# Patient Record
Sex: MALE | Race: WHITE | HISPANIC OR LATINO | Employment: OTHER | URBAN - METROPOLITAN AREA
[De-identification: names, ages, dates, MRNs, and addresses within clinical notes are randomized per-mention and may not be internally consistent; named-entity substitution may affect disease eponyms.]

---

## 2018-03-21 ENCOUNTER — RECORDS - HEALTHEAST (OUTPATIENT)
Dept: LAB | Facility: CLINIC | Age: 78
End: 2018-03-21

## 2018-03-21 LAB
ANION GAP SERPL CALCULATED.3IONS-SCNC: 8 MMOL/L (ref 5–18)
BUN SERPL-MCNC: 13 MG/DL (ref 8–28)
CALCIUM SERPL-MCNC: 9.4 MG/DL (ref 8.5–10.5)
CHLORIDE BLD-SCNC: 106 MMOL/L (ref 98–107)
CHOLEST SERPL-MCNC: 129 MG/DL
CO2 SERPL-SCNC: 27 MMOL/L (ref 22–31)
CREAT SERPL-MCNC: 0.81 MG/DL (ref 0.7–1.3)
FASTING STATUS PATIENT QL REPORTED: ABNORMAL
GFR SERPL CREATININE-BSD FRML MDRD: >60 ML/MIN/1.73M2
GLUCOSE BLD-MCNC: 99 MG/DL (ref 70–125)
HDLC SERPL-MCNC: 35 MG/DL
LDLC SERPL CALC-MCNC: 69 MG/DL
POTASSIUM BLD-SCNC: 4.3 MMOL/L (ref 3.5–5)
SODIUM SERPL-SCNC: 141 MMOL/L (ref 136–145)
TRIGL SERPL-MCNC: 125 MG/DL

## 2018-05-01 ENCOUNTER — RECORDS - HEALTHEAST (OUTPATIENT)
Dept: ADMINISTRATIVE | Facility: OTHER | Age: 78
End: 2018-05-01

## 2018-05-02 ENCOUNTER — HOSPITAL ENCOUNTER (OUTPATIENT)
Dept: CT IMAGING | Facility: CLINIC | Age: 78
Discharge: HOME | End: 2018-05-02
Attending: SURGERY

## 2018-05-02 DIAGNOSIS — I65.29 CAROTID STENOSIS: ICD-10-CM

## 2018-05-02 LAB
CREAT BLD-MCNC: 1.1 MG/DL
POC GFR AMER AF HE - HISTORICAL: >60 ML/MIN/1.73M2
POC GFR NON AMER AF HE - HISTORICAL: >60 ML/MIN/1.73M2

## 2018-05-07 ENCOUNTER — AMBULATORY - HEALTHEAST (OUTPATIENT)
Dept: CARDIOLOGY | Facility: CLINIC | Age: 78
End: 2018-05-07

## 2018-05-07 ENCOUNTER — RECORDS - HEALTHEAST (OUTPATIENT)
Dept: ADMINISTRATIVE | Facility: OTHER | Age: 78
End: 2018-05-07

## 2018-05-08 ENCOUNTER — OFFICE VISIT - HEALTHEAST (OUTPATIENT)
Dept: CARDIOLOGY | Facility: CLINIC | Age: 78
End: 2018-05-08

## 2018-05-08 DIAGNOSIS — E78.00 PURE HYPERCHOLESTEROLEMIA WITH TARGET LOW DENSITY LIPOPROTEIN (LDL) CHOLESTEROL LESS THAN 70 MG/DL: ICD-10-CM

## 2018-05-08 DIAGNOSIS — I10 ESSENTIAL HYPERTENSION: ICD-10-CM

## 2018-05-08 DIAGNOSIS — I25.10 CORONARY ARTERY DISEASE INVOLVING NATIVE CORONARY ARTERY OF NATIVE HEART WITHOUT ANGINA PECTORIS: ICD-10-CM

## 2018-05-08 RX ORDER — NITROGLYCERIN 0.4 MG/1
0.4 TABLET SUBLINGUAL EVERY 5 MIN PRN
Refills: 1 | Status: SHIPPED | COMMUNITY
Start: 2018-03-23

## 2018-05-08 RX ORDER — TAMSULOSIN HYDROCHLORIDE 0.4 MG/1
0.4 CAPSULE ORAL
Refills: 1 | Status: SHIPPED | COMMUNITY
Start: 2018-03-15

## 2018-05-08 RX ORDER — IBUPROFEN 800 MG/1
800 TABLET, FILM COATED ORAL EVERY 8 HOURS PRN
Status: SHIPPED | COMMUNITY
Start: 2018-05-08

## 2018-05-08 RX ORDER — ATORVASTATIN CALCIUM 40 MG/1
40 TABLET, FILM COATED ORAL AT BEDTIME
Refills: 4 | Status: SHIPPED | COMMUNITY
Start: 2018-03-08

## 2018-05-08 ASSESSMENT — MIFFLIN-ST. JEOR: SCORE: 1311.81

## 2018-05-14 ENCOUNTER — ANESTHESIA - HEALTHEAST (OUTPATIENT)
Dept: SURGERY | Facility: CLINIC | Age: 78
End: 2018-05-14

## 2018-05-15 ENCOUNTER — SURGERY - HEALTHEAST (OUTPATIENT)
Dept: SURGERY | Facility: CLINIC | Age: 78
End: 2018-05-15

## 2018-05-15 ASSESSMENT — MIFFLIN-ST. JEOR: SCORE: 1294.23

## 2018-05-16 ASSESSMENT — MIFFLIN-ST. JEOR: SCORE: 1316.35

## 2019-01-23 ENCOUNTER — RECORDS - HEALTHEAST (OUTPATIENT)
Dept: LAB | Facility: CLINIC | Age: 79
End: 2019-01-23

## 2019-01-23 LAB
ANION GAP SERPL CALCULATED.3IONS-SCNC: 11 MMOL/L (ref 5–18)
BUN SERPL-MCNC: 15 MG/DL (ref 8–28)
CALCIUM SERPL-MCNC: 9.4 MG/DL (ref 8.5–10.5)
CHLORIDE BLD-SCNC: 102 MMOL/L (ref 98–107)
CHOLEST SERPL-MCNC: 113 MG/DL
CO2 SERPL-SCNC: 27 MMOL/L (ref 22–31)
CREAT SERPL-MCNC: 0.88 MG/DL (ref 0.7–1.3)
FASTING STATUS PATIENT QL REPORTED: ABNORMAL
GFR SERPL CREATININE-BSD FRML MDRD: >60 ML/MIN/1.73M2
GLUCOSE BLD-MCNC: 84 MG/DL (ref 70–125)
HDLC SERPL-MCNC: 35 MG/DL
LDLC SERPL CALC-MCNC: 47 MG/DL
POTASSIUM BLD-SCNC: 4.2 MMOL/L (ref 3.5–5)
SODIUM SERPL-SCNC: 140 MMOL/L (ref 136–145)
TRIGL SERPL-MCNC: 155 MG/DL

## 2019-07-03 ENCOUNTER — AMBULATORY - HEALTHEAST (OUTPATIENT)
Dept: CARDIOLOGY | Facility: CLINIC | Age: 79
End: 2019-07-03

## 2019-07-03 ENCOUNTER — RECORDS - HEALTHEAST (OUTPATIENT)
Dept: ADMINISTRATIVE | Facility: OTHER | Age: 79
End: 2019-07-03

## 2019-07-05 ENCOUNTER — OFFICE VISIT - HEALTHEAST (OUTPATIENT)
Dept: CARDIOLOGY | Facility: CLINIC | Age: 79
End: 2019-07-05

## 2019-07-05 DIAGNOSIS — R06.09 DYSPNEA ON EXERTION: ICD-10-CM

## 2019-07-05 DIAGNOSIS — I77.9 CAROTID ARTERIAL DISEASE (H): ICD-10-CM

## 2019-07-05 DIAGNOSIS — I25.10 CORONARY ARTERY DISEASE INVOLVING NATIVE CORONARY ARTERY WITHOUT ANGINA PECTORIS: ICD-10-CM

## 2019-07-05 LAB
BNP SERPL-MCNC: 101 PG/ML (ref 0–86)
ERYTHROCYTE [DISTWIDTH] IN BLOOD BY AUTOMATED COUNT: 12.3 % (ref 11–14.5)
HCT VFR BLD AUTO: 45.7 % (ref 40–54)
HGB BLD-MCNC: 15 G/DL (ref 14–18)
MCH RBC QN AUTO: 29.4 PG (ref 27–34)
MCHC RBC AUTO-ENTMCNC: 32.8 G/DL (ref 32–36)
MCV RBC AUTO: 90 FL (ref 80–100)
PLATELET # BLD AUTO: 192 THOU/UL (ref 140–440)
PMV BLD AUTO: 11.7 FL (ref 8.5–12.5)
RBC # BLD AUTO: 5.1 MILL/UL (ref 4.4–6.2)
WBC: 11.4 THOU/UL (ref 4–11)

## 2019-07-05 RX ORDER — CHLORHEXIDINE GLUCONATE ORAL RINSE 1.2 MG/ML
15 SOLUTION DENTAL 2 TIMES DAILY
Status: SHIPPED | COMMUNITY
Start: 2019-06-21

## 2019-07-05 ASSESSMENT — MIFFLIN-ST. JEOR: SCORE: 1342.66

## 2019-07-18 ENCOUNTER — HOSPITAL ENCOUNTER (OUTPATIENT)
Dept: CARDIOLOGY | Facility: CLINIC | Age: 79
Discharge: HOME | End: 2019-07-18
Attending: INTERNAL MEDICINE

## 2019-07-18 DIAGNOSIS — R06.09 DYSPNEA ON EXERTION: ICD-10-CM

## 2019-07-18 DIAGNOSIS — R06.09 OTHER FORMS OF DYSPNEA: ICD-10-CM

## 2019-07-18 DIAGNOSIS — I25.10 CORONARY ARTERY DISEASE INVOLVING NATIVE CORONARY ARTERY WITHOUT ANGINA PECTORIS: ICD-10-CM

## 2019-07-18 LAB
AORTIC ROOT: 2.9 CM
BSA FOR ECHO PROCEDURE: 1.81 M2
CV BLOOD PRESSURE: ABNORMAL MMHG
CV ECHO HEIGHT: 65 IN
CV ECHO WEIGHT: 157 LBS
DOP CALC LVOT AREA: 2.54 CM2
DOP CALC LVOT DIAMETER: 1.8 CM
DOP CALC LVOT PEAK VEL: 72.2 CM/S
DOP CALC LVOT STROKE VOLUME: 46.5 CM3
DOP CALCLVOT PEAK VEL VTI: 18.3 CM
ECHO EJECTION FRACTION ESTIMATED: 65 %
EJECTION FRACTION: 77 % (ref 55–75)
FRACTIONAL SHORTENING: 42.6 % (ref 28–44)
INTERVENTRICULAR SEPTUM IN END DIASTOLE: 1.03 CM (ref 0.6–1)
IVS/PW RATIO: 1.1
LA AREA 1: 14.4 CM2
LEFT ATRIUM LENGTH: 4.4 CM
LEFT ATRIUM SIZE: 4.3 CM
LEFT ATRIUM TO AORTIC ROOT RATIO: 1.48 NO UNITS
LEFT VENTRICLE CARDIAC INDEX: 1.3 L/MIN/M2
LEFT VENTRICLE CARDIAC OUTPUT: 2.3 L/MIN
LEFT VENTRICLE DIASTOLIC VOLUME INDEX: 30.9 CM3/M2 (ref 34–74)
LEFT VENTRICLE DIASTOLIC VOLUME: 56 CM3 (ref 62–150)
LEFT VENTRICLE HEART RATE: 49 BPM
LEFT VENTRICLE MASS INDEX: 94.1 G/M2
LEFT VENTRICLE SYSTOLIC VOLUME INDEX: 7.2 CM3/M2 (ref 11–31)
LEFT VENTRICLE SYSTOLIC VOLUME: 13 CM3 (ref 21–61)
LEFT VENTRICULAR INTERNAL DIMENSION IN DIASTOLE: 4.91 CM (ref 4.2–5.8)
LEFT VENTRICULAR INTERNAL DIMENSION IN SYSTOLE: 2.82 CM (ref 2.5–4)
LEFT VENTRICULAR MASS: 170.4 G
LEFT VENTRICULAR OUTFLOW TRACT MEAN GRADIENT: 1 MMHG
LEFT VENTRICULAR OUTFLOW TRACT MEAN VELOCITY: 50.1 CM/S
LEFT VENTRICULAR OUTFLOW TRACT PEAK GRADIENT: 2 MMHG
LEFT VENTRICULAR POSTERIOR WALL IN END DIASTOLE: 0.92 CM (ref 0.6–1)
LV STROKE VOLUME INDEX: 25.7 ML/M2
MITRAL VALVE E/A RATIO: 1.5
MV AVERAGE E/E' RATIO: 12.9 CM/S
MV DECELERATION TIME: 169 MS
MV E'TISSUE VEL-LAT: 9.46 CM/S
MV E'TISSUE VEL-MED: 5.46 CM/S
MV LATERAL E/E' RATIO: 10.2
MV MEDIAL E/E' RATIO: 17.6
MV PEAK A VELOCITY: 65.6 CM/S
MV PEAK E VELOCITY: 96.3 CM/S
NUC REST DIASTOLIC VOLUME INDEX: 2512 LBS
NUC REST SYSTOLIC VOLUME INDEX: 65 IN
PR MAX PG: 4 MMHG
PR PEAK VELOCITY: 95.7 CM/S
TRICUSPID REGURGITATION PEAK PRESSURE GRADIENT: 28.5 MMHG
TRICUSPID VALVE PEAK REGURGITANT VELOCITY: 267 CM/S

## 2019-07-18 ASSESSMENT — MIFFLIN-ST. JEOR: SCORE: 1339.03

## 2019-07-19 ENCOUNTER — COMMUNICATION - HEALTHEAST (OUTPATIENT)
Dept: CARDIOLOGY | Facility: CLINIC | Age: 79
End: 2019-07-19

## 2019-07-19 DIAGNOSIS — I25.10 CORONARY ARTERY DISEASE INVOLVING NATIVE CORONARY ARTERY WITHOUT ANGINA PECTORIS: ICD-10-CM

## 2019-07-19 DIAGNOSIS — R06.09 DYSPNEA ON EXERTION: ICD-10-CM

## 2019-07-25 ENCOUNTER — HOSPITAL ENCOUNTER (OUTPATIENT)
Dept: RESPIRATORY THERAPY | Facility: HOSPITAL | Age: 79
Discharge: HOME | End: 2019-07-25
Attending: SURGERY

## 2019-07-25 DIAGNOSIS — R06.02 SOB (SHORTNESS OF BREATH): ICD-10-CM

## 2019-07-25 LAB
BASE EXCESS BLDA CALC-SCNC: 0.8 MMOL/L
COHGB MFR BLD: 98.4 % (ref 95–96)
HCO3, ARTERIAL CALC - HISTORICAL: 25.5 MMOL/L (ref 23–29)
O2/TOTAL GAS SETTING VFR VENT: ABNORMAL %
OXYHEMOGLOBIN - HISTORICAL: 96.6 % (ref 95–96)
PCO2 BLD: 35 MM HG (ref 35–45)
PH BLD: 7.45 [PH] (ref 7.37–7.44)
PO2 BLD: 89 MM HG (ref 75–85)
TEMPERATURE: 37 DEGREES C
VENTILATION MODE: ABNORMAL

## 2019-07-26 ENCOUNTER — HOSPITAL ENCOUNTER (OUTPATIENT)
Dept: NUCLEAR MEDICINE | Facility: CLINIC | Age: 79
Discharge: HOME | End: 2019-07-26
Attending: INTERNAL MEDICINE

## 2019-07-26 ENCOUNTER — HOSPITAL ENCOUNTER (OUTPATIENT)
Dept: CARDIOLOGY | Facility: CLINIC | Age: 79
Discharge: HOME | End: 2019-07-26
Attending: INTERNAL MEDICINE

## 2019-07-26 ENCOUNTER — COMMUNICATION - HEALTHEAST (OUTPATIENT)
Dept: TELEHEALTH | Facility: CLINIC | Age: 79
End: 2019-07-26

## 2019-07-26 DIAGNOSIS — R06.09 OTHER FORMS OF DYSPNEA: ICD-10-CM

## 2019-07-26 DIAGNOSIS — R06.09 DYSPNEA ON EXERTION: ICD-10-CM

## 2019-07-26 DIAGNOSIS — I25.10 CORONARY ARTERY DISEASE INVOLVING NATIVE CORONARY ARTERY WITHOUT ANGINA PECTORIS: ICD-10-CM

## 2019-07-26 LAB
CV STRESS CURRENT BP HE: NORMAL
CV STRESS CURRENT HR HE: 48
CV STRESS CURRENT HR HE: 49
CV STRESS CURRENT HR HE: 62
CV STRESS CURRENT HR HE: 64
CV STRESS CURRENT HR HE: 65
CV STRESS CURRENT HR HE: 66
CV STRESS CURRENT HR HE: 66
CV STRESS CURRENT HR HE: 67
CV STRESS CURRENT HR HE: 67
CV STRESS CURRENT HR HE: 68
CV STRESS CURRENT HR HE: 70
CV STRESS CURRENT HR HE: 71
CV STRESS DEVIATION TIME HE: NORMAL
CV STRESS ECHO PERCENT HR HE: NORMAL
CV STRESS EXERCISE STAGE HE: NORMAL
CV STRESS FINAL RESTING BP HE: NORMAL
CV STRESS FINAL RESTING HR HE: 65
CV STRESS MAX HR HE: 71
CV STRESS MAX TREADMILL GRADE HE: 0
CV STRESS MAX TREADMILL SPEED HE: 0
CV STRESS PEAK DIA BP HE: NORMAL
CV STRESS PEAK SYS BP HE: NORMAL
CV STRESS PHASE HE: NORMAL
CV STRESS PROTOCOL HE: NORMAL
CV STRESS RESTING PT POSITION HE: NORMAL
CV STRESS ST DEVIATION AMOUNT HE: NORMAL
CV STRESS ST DEVIATION ELEVATION HE: NORMAL
CV STRESS ST EVELATION AMOUNT HE: NORMAL
CV STRESS TEST TYPE HE: NORMAL
CV STRESS TOTAL STAGE TIME MIN 1 HE: NORMAL
NUC STRESS EJECTION FRACTION: 67 %
STRESS ECHO BASELINE BP: NORMAL
STRESS ECHO BASELINE HR: 46
STRESS ECHO CALCULATED PERCENT HR: 51 %
STRESS ECHO LAST STRESS BP: NORMAL
STRESS ECHO LAST STRESS HR: 70

## 2019-08-01 ENCOUNTER — OFFICE VISIT - HEALTHEAST (OUTPATIENT)
Dept: CARDIOLOGY | Facility: CLINIC | Age: 79
End: 2019-08-01

## 2019-08-01 DIAGNOSIS — I77.9 CAROTID ARTERIAL DISEASE (H): ICD-10-CM

## 2019-08-01 DIAGNOSIS — R06.09 DYSPNEA ON EXERTION: ICD-10-CM

## 2019-08-01 DIAGNOSIS — I25.10 CORONARY ARTERY DISEASE INVOLVING NATIVE CORONARY ARTERY OF NATIVE HEART WITHOUT ANGINA PECTORIS: ICD-10-CM

## 2019-08-01 ASSESSMENT — MIFFLIN-ST. JEOR: SCORE: 1348.1

## 2020-07-15 ENCOUNTER — RECORDS - HEALTHEAST (OUTPATIENT)
Dept: LAB | Facility: CLINIC | Age: 80
End: 2020-07-15

## 2020-07-15 LAB
ANION GAP SERPL CALCULATED.3IONS-SCNC: 10 MMOL/L (ref 5–18)
BUN SERPL-MCNC: 17 MG/DL (ref 8–28)
CALCIUM SERPL-MCNC: 9.2 MG/DL (ref 8.5–10.5)
CHLORIDE BLD-SCNC: 106 MMOL/L (ref 98–107)
CHOLEST SERPL-MCNC: 123 MG/DL
CO2 SERPL-SCNC: 24 MMOL/L (ref 22–31)
CREAT SERPL-MCNC: 1.03 MG/DL (ref 0.7–1.3)
FASTING STATUS PATIENT QL REPORTED: ABNORMAL
GFR SERPL CREATININE-BSD FRML MDRD: >60 ML/MIN/1.73M2
GLUCOSE BLD-MCNC: 131 MG/DL (ref 70–125)
HDLC SERPL-MCNC: 32 MG/DL
LDLC SERPL CALC-MCNC: 55 MG/DL
POTASSIUM BLD-SCNC: 4.4 MMOL/L (ref 3.5–5)
PSA SERPL-MCNC: 2.7 NG/ML (ref 0–6.5)
SODIUM SERPL-SCNC: 140 MMOL/L (ref 136–145)
TRIGL SERPL-MCNC: 178 MG/DL

## 2021-05-30 NOTE — TELEPHONE ENCOUNTER
Call back received from patient daughter, results/recommendations reviewed. Daughter verbalized understanding and agreement with plan. Order for test placed-- transferred to scheduling to arrange. -jermaine

## 2021-05-30 NOTE — PATIENT INSTRUCTIONS - HE
Kevin Leblanc,    It was a pleasure to see you today at the [facility].     My recommendations after this visit include:    1.  We will obtain some blood work today to evaluate your shortness of breath.  We will call you with the results and any further recommendations.    2.  We will schedule an echocardiogram to evaluate the pumping function of your heart.  We will evaluate the pumping function of both the left and right ventricles.    3.  I will send a note to Dr. Kieran Griffin.  I suspect a sleep study would be a good test for you as I suspect to have sleep apnea Dr. Griffin can arrange this if he feels it is appropriate.        Axel Becerra

## 2021-05-30 NOTE — PROGRESS NOTES
RESPIRATORY CARE NOTE     Patient Name: Kevin Leblnac  Today's Date: 7/25/2019     Complete PFT done. Pt performed tests with good effort. DLCO and Pleth does not meet ATS criteria.  FVC does meet ATS criteria.  Multiple efforts done. Pt gave good efforts, best efforts saved.  ABGs drawn on room air.  Interpeter here for testing. Results scanned into epic. Pt left in no distress.       Renu Peguero, CYNDEET

## 2021-05-30 NOTE — TELEPHONE ENCOUNTER
----- Message from Axel Becerra MD sent at 7/19/2019  1:01 PM CDT -----  Karly,    Echocardiogram looks good.  Would recommend pharmacologic or stress nuclear study.    Thanks,    Axel

## 2021-05-31 NOTE — PROGRESS NOTES
Today the patient comes in to discuss his abnormal stress test.  He had four-vessel bypass in 2015.  He had an ANAYELI graft to the LAD, saphenous vein graft to the distal LAD.  His current pharmacologic nuclear study shows a medium size reversible defect in the anterior lateral wall.  He also had a saphenous vein graft to the ramus intermedius and a saphenous vein graft to the PDA performed by Dr. Avalos.  The current pharmacologic nuclear study shows normal left ventricular ejection fraction.    The patient actually feels better.  His daughter has been forcing him to walk on a regular basis and his breathing is not giving him any troubles.  He feels quite comfortable with his breathing is not having any chest discomfort.    We discussed today that coronary angiography with the possibility of percutaneous intervention could improve symptoms but he really is not having any dramatic symptoms at this time.  We also talked that percutaneous intervention in the setting of a medium size defect would not improve the longevity statistically speaking, nor would it reduce the risk of future acute coronary syndrome statistically.  Because of this I think it would be quite legitimate to forego coronary angiography at this time but continue to monitor his symptoms.  If his breathing worsens or he develop chest discomfort, I would have a very low threshold for performing coronary angiography with graft angiography.  I would not repeat stress testing.    The patient and his daughter are completely in agreement with this strategy and will let us know if his symptoms return or he gets new symptoms.  We will plan a follow-up in 6 months.    Thank you for allowing us to participate in his care.

## 2021-06-01 VITALS — HEIGHT: 65 IN | WEIGHT: 151 LBS | BODY MASS INDEX: 25.16 KG/M2

## 2021-06-01 VITALS — WEIGHT: 152 LBS | BODY MASS INDEX: 25.33 KG/M2 | HEIGHT: 65 IN

## 2021-06-03 VITALS — HEIGHT: 65 IN | WEIGHT: 159 LBS | BODY MASS INDEX: 26.49 KG/M2

## 2021-06-03 VITALS — BODY MASS INDEX: 26.16 KG/M2 | HEIGHT: 65 IN | WEIGHT: 157 LBS

## 2021-06-03 VITALS — WEIGHT: 157.8 LBS | HEIGHT: 65 IN | BODY MASS INDEX: 26.29 KG/M2

## 2021-06-16 PROBLEM — I25.10 CORONARY ARTERY DISEASE INVOLVING NATIVE CORONARY ARTERY WITHOUT ANGINA PECTORIS: Status: ACTIVE | Noted: 2019-07-05

## 2021-06-16 PROBLEM — R06.09 DYSPNEA ON EXERTION: Status: ACTIVE | Noted: 2019-07-05

## 2021-06-16 PROBLEM — I77.9 CAROTID ARTERIAL DISEASE (H): Status: ACTIVE | Noted: 2018-05-15

## 2021-06-18 NOTE — ANESTHESIA POSTPROCEDURE EVALUATION
Patient: Kevin Leblanc  RIGHT CAROTID ENDARTERECTOMY WITH NEURO MONITORING  Anesthesia type: general    Patient location: PACU  Last vitals:   Vitals:    05/15/18 1125   BP:    Pulse: (!) 54   Resp: 16   Temp:    SpO2: 99%     Post vital signs: stable  Level of consciousness: awake and responds to simple questions  Post-anesthesia pain: pain controlled  Post-anesthesia nausea and vomiting: no  Pulmonary: unassisted, return to baseline  Cardiovascular: stable and blood pressure at baseline  Hydration: adequate  Anesthetic events: no    QCDR Measures:  ASA# 11 - Keyla-op Cardiac Arrest: ASA11B - Patient did NOT experience unanticipated cardiac arrest  ASA# 12 - Keyla-op Mortality Rate: ASA12B - Patient did NOT die  ASA# 13 - PACU Re-Intubation Rate: ASA13B - Patient did NOT require a new airway mgmt  ASA# 10 - Composite Anes Safety: ASA10A - No serious adverse event    Additional Notes:

## 2021-06-18 NOTE — ANESTHESIA PREPROCEDURE EVALUATION
Anesthesia Evaluation      Patient summary reviewed   No history of anesthetic complications     Airway   Mallampati: I  Neck ROM: full   Pulmonary - normal exam   (+) shortness of breath,                          Cardiovascular - normal exam  (+) hypertension, CAD, CABG/stent, ,     ECG reviewed        Neuro/Psych    (+) neuromuscular disease,  CVA ,     Endo/Other       GI/Hepatic/Renal    (+) hiatal hernia,   chronic renal disease,           Dental    (+) edentulous                       Anesthesia Plan  Planned anesthetic: general endotracheal    ASA 3   Induction: intravenous   Anesthetic plan and risks discussed with: patient and  services used  Anesthesia plan special considerations: antiemetics, arterial catheterization, dexmedetomidine  Post-op plan: routine recovery

## 2021-06-18 NOTE — ANESTHESIA PROCEDURE NOTES
Arterial Line  Reason for Procedure: hemodynamic monitoring  Patient location during procedure: Pre-op  Start time: 5/15/2018 7:22 AM  End time: 5/15/2018 7:31 AM  Staffing:  Performing  Anesthesiologist: JOSE BEASLEY  Performing CRNA: HILARY KUMAR  Sterile Precautions:  sterile barriers used during insertion: cap, mask, sterile gloves, large sheet, and hand hygiene used.  Arterial Line:   Immediately prior to procedure a time out was called to verify the correct patient, procedure, equipment, support staff and site/side marked as required  Laterality: left  Location: radial  Prepped with: ChloroPrep    Needle gauge: 20 G  Number of Attempts: 1  Secured with: transparent dressing, pressure dressing and tape  Flushed with: saline  1% lidocaine local anesthesia used for skin prep.   See MAR for additional medications given.

## 2021-06-18 NOTE — ANESTHESIA CARE TRANSFER NOTE
Last vitals:   Vitals:    05/15/18 0959   BP: 172/56   Pulse: 74   Resp: 16   Temp: 36.4  C (97.6  F)   SpO2: 100%     Patient's level of consciousness is drowsy  Spontaneous respirations: yes  Maintains airway independently: yes  Dentition unchanged: yes  Oropharynx: oropharynx clear of all foreign objects    QCDR Measures:  ASA# 20 - Surgical Safety Checklist: WHO surgical safety checklist completed prior to induction  PQRS# 430 - Adult PONV Prevention: 4558F - Pt received => 2 anti-emetic agents (different classes) preop & intraop  ASA# 8 - Peds PONV Prevention: NA - Not pediatric patient, not GA or 2 or more risk factors NOT present  PQRS# 424 - Keyla-op Temp Management: 4559F - At least one body temp DOCUMENTED => 35.5C or 95.9F within required timeframe  PQRS# 426 - PACU Transfer Protocol: - Transfer of care checklist used  ASA# 14 - Acute Post-op Pain: ASA14B - Patient did NOT experience pain >= 7 out of 10   36.7

## 2021-06-26 ENCOUNTER — HEALTH MAINTENANCE LETTER (OUTPATIENT)
Age: 81
End: 2021-06-26

## 2021-06-26 NOTE — PROGRESS NOTES
Progress Notes by Noemi Palacios MD at 5/8/2018  1:20 PM     Author: Noemi Palacios MD Service: -- Author Type: Physician    Filed: 5/8/2018  4:20 PM Encounter Date: 5/8/2018 Status: Signed    : Noemi Palacios MD (Physician)           Click to link to [facility] HEART CARE NOTE    Thank you, Dr. Griffin and Dr. Guy, for asking the [facility] team to see Mr. Kevin Leblanc to evaluate preoperative cardiac risk.    Assessment/Recommendations   Assessment:    1.  Coronary artery disease, status post four-vessel coronary artery bypass grafting times 4 May 2015.  Patient underwent nuclear stress study 1.5 years later demonstrating no evidence of ischemia.  He currently reports no symptoms of exertional chest discomfort or exertional dyspnea when climbing up 2 flights of stairs with groceries.  He does not do any formal exercise.  At this point, I see no contraindication for vascular surgery if clinically indicated.  2.  Essential hypertension, borderline controlled.  His blood pressure is slightly elevated here today.  I would recommend follow-up with his primary physician to see if any further adjustment in blood pressure medication indicated.  3.  Mixed hyperlipidemia, well controlled on current therapy  4.  Cerebrovascular disease with evidence of an 80% stenosis in the right internal carotid artery    Plan:  1.  Continue current medications  2.  No need to pursue cardiac workup at this time       History of Present Illness    Mr. Kevin Leblanc is a 77 y.o. male with history of essential hypertension, mixed hyperlipidemia and coronary artery disease status post coronary artery bypass grafting ×4 in May 2015 at [facility] with subsequent negative nuclear stress study in November 2016 presents today for preoperative evaluation.  Since undergoing his bypass surgery, he reports no recurrence of his previous ischemic symptoms which was chest pain radiating to the jaw.  He  admits that he does not do any formal exercise but does walk up a flight of stairs to his second floor apartment without difficulty, even when he carries his groceries.  No symptoms of orthopnea, PND or lower extremity edema.  No symptoms of lightheadedness.  He recently had a carotid ultrasound performed suggesting high-grade stenosis in the right carotid artery of 80-99%, although subsequent CTA documented in 80% stenosis in the right internal carotid artery.  He has since been seen by Dr. Guy although I do not have that report.    ECG (personally reviewed): No recent ECG    Cardiac Imaging Studies (personally reviewed): No recent cardiac imaging     Physical Examination Review of Systems   Vitals:    05/08/18 1326   BP: 138/60   Pulse: 72   Resp: 16     Body mass index is 25.13 kg/(m^2).  Wt Readings from Last 3 Encounters:   05/08/18 151 lb (68.5 kg)     General Appearance:   Awake, Alert, No acute distress.   HEENT:  No scleral icterus; the mucous membranes were pink and moist.   Neck: No jugular venous distention.  A bruit is noted in the right carotid.   Chest: The spine was straight. The chest was symmetric.   Lungs:   Respirations unlabored; the lungs are clear to auscultation. No wheezing   Cardiovascular:    Regular rate and rhythm.  S1, S2 normal.  No murmur or gallop heard.   Abdomen:  No organomegaly, masses, bruits, or tenderness. Bowels sounds are present   Extremities:  Peripheral edema, clubbing or cyanosis.  Distal pulses are symmetric.   Skin: No xanthelasma. Warm, Dry.   Musculoskeletal: No tenderness.   Neurologic: Mood and affect are appropriate.    General: WNL  Eyes: Visual Distubance  Ears/Nose/Throat: Hearing Loss  Lungs: Shortness of Breath  Heart: Shortness of Breath with activity  Stomach: WNL  Bladder: Frequent Urination at Night  Muscle/Joints: WNL  Skin: WNL  Nervous System: Daytime Sleepiness, Dizziness, Loss of Balance  Mental Health: Confusion     Blood: WNL     Medical  History  Surgical History Family History Social History   Past Medical History:   Diagnosis Date   ? Coronary artery disease    ? Hyperlipidemia    ? Hypertension     Past Surgical History:   Procedure Laterality Date   ? CORONARY ARTERY BYPASS GRAFT  2015    No family history of early coronary artery disease Social History     Social History   ? Marital status:      Spouse name: N/A   ? Number of children: N/A   ? Years of education: N/A     Occupational History   ? Not on file.     Social History Main Topics   ? Smoking status: Former Smoker     Types: Cigarettes   ? Smokeless tobacco: Never Used   ? Alcohol use Not on file   ? Drug use: No   ? Sexual activity: Not on file     Other Topics Concern   ? Not on file     Social History Narrative   ? No narrative on file          Medications  Allergies   Current Outpatient Prescriptions   Medication Sig Dispense Refill   ? aspirin 325 MG EC tablet Take 325 mg by mouth daily.     ? atorvastatin (LIPITOR) 40 MG tablet Take 1 tablet by mouth daily.  4   ? ibuprofen (ADVIL,MOTRIN) 800 MG tablet Take 800 mg by mouth every 6 (six) hours as needed for pain.     ? metoprolol succinate (TOPROL-XL) 25 MG Take 1 tablet by mouth daily.  4   ? nitroglycerin (NITROSTAT) 0.4 MG SL tablet as needed.  1   ? tamsulosin (FLOMAX) 0.4 mg Cp24 Take 1 capsule by mouth daily.  1     No current facility-administered medications for this visit.       Allergies   Allergen Reactions   ? Oxycodone    ? Plavix [Clopidogrel]    ? Vioxx [Rofecoxib]          Lab Results    Chemistry/lipid CBC Cardiac Enzymes/BNP/TSH/INR   Lab Results   Component Value Date    CHOL 129 03/21/2018    HDL 35 (L) 03/21/2018    LDLCALC 69 03/21/2018    TRIG 125 03/21/2018    CREATININE 0.81 03/21/2018    BUN 13 03/21/2018    K 4.3 03/21/2018     03/21/2018     03/21/2018    CO2 27 03/21/2018    No results found for: WBC, HGB, HCT, MCV, PLT No results found for: CKTOTAL, CKMB, CKMBINDEX, TROPONINI, BNP,  TSH, INR

## 2021-06-27 NOTE — PROGRESS NOTES
Progress Notes by Axel Becerra MD at 7/5/2019 10:50 AM     Author: Axel Becerra MD Service: -- Author Type: Physician    Filed: 7/5/2019 11:38 AM Encounter Date: 7/5/2019 Status: Signed    : Axel Becerra MD (Physician)           Click to link to [facility] Heart [facility] HEART [facility] NOTE    Thank you, Dr. Griffin, for asking us to see Kevin Leblanc at the [facility].      Assessment/Recommendations   Patient with known coronary artery disease, known carotid artery disease who has more shortness of breath with activity.  He is not having chest discomfort but I am concerned about the possibility of sleep apnea as well as cardiac induced shortness of breath.    We will check a B natruretic peptide and his CBC today.  We will schedule an echocardiogram to look at the left and right ventricular systolic function.    I will advise the patient to discuss the possibility of a sleep study with Dr. Griffin as he snores loudly and it sounds like he has clear daytime somnolence.    If the above testing is unremarkable, I would have a low threshold for repeating nuclear exercise test or CT angiogram.    Thank you for allowing us to participate in his care.         History of Present Illness    Mr. Kevin Leblanc is a 78 y.o. male with known coronary artery disease and known carotid artery disease.  He has had bilateral carotid endarterectomies.  He had coronary artery bypass surgery I believe in 2015 at [facility].  Dr. Palacios's notes indicates that he had an unremarkable stress nuclear study about a year and a half after his bypass surgery.    He continues to have shortness of breath with activity.  If he walks briskly he will get short of breath.  He can do one flight of stairs slowly and he does this on a regular basis at home but more vigorous physical activity causes him to be short of breath quickly.  He gets a little bit of a left-sided chest discomfort if he  lies on his left side but if he lies on his back he does not get the discomfort.  He does not get chest discomfort with physical activity.  Prior to bypass surgery he had some chest discomforts and he had some near syncopal or syncopal episodes.    He denies orthopnea or paroxysmal nocturnal dyspnea.  His daughter says that he does snore quite loudly.  He falls asleep during the day very quickly and naps throughout the day as well.  He is never had a sleep study.    He denies any peripheral edema, recent syncope or near syncopal episodes.  He does not have a history of rheumatic fever, cerebrovascular accident or TIA based on his intake report.    His LDL cholesterol is very well treated on his current statin dose.    Patient is a borderline diabetic.  He quit smoking in the 1970s.  Lipids are well treated.  He is on treatment for hypertension.    He takes Brilinta and a baby aspirin.  I believe Dr. Coello felt that he could take Brilinta for short period of time after his carotid endarterectomy in 2018.  After his testing we may advise him that he can discontinue Brilinta.         Physical Examination Review of Systems   Vitals:    07/05/19 1101   BP: 160/62   Pulse: 60   Resp: 16     Body mass index is 26.26 kg/m .  Wt Readings from Last 3 Encounters:   07/05/19 157 lb 12.8 oz (71.6 kg)   05/16/18 152 lb (68.9 kg)   05/08/18 151 lb (68.5 kg)     General Appearance:   Alert, cooperative and in no acute distress.   ENT/Mouth: Oral mucuos membranes pink and moist .      EYES:  No scleral icterus. No Xanthelasma.    Neck: JVP normal. No Hepatojugular reflux. Thyroid not visualized   Chest/Lungs:   Lungs slight diminished breath sounds throughout the lung fields., equal chest wall expansion    Cardiovascular:   S1, S2 without murmur ,clicks or rubs. Brachial, radial and posterior tibial pulses are intact and symetric. No carotid bruits noted area and scars over both carotids   Abdomen:  Nontender. BS+.  Rounded       Extremities: No cyanosis, clubbing or edema   Skin: no xanthelasma, warm.    Psych: Appropriate affect.   Neurologic: normal gait, normal  bilateral, no tremors        General: WNL  Eyes: WNL  Ears/Nose/Throat: WNL  Lungs: WNL  Heart: WNL  Stomach: WNL  Bladder: WNL  Muscle/Joints: WNL  Skin: WNL  Nervous System: WNL  Mental Health: WNL     Blood: WNL     Medical History  Surgical History Family History Social History   Past Medical History:   Diagnosis Date   ? BPH (benign prostatic hyperplasia)    ? Carotid artery stenosis     Bilateral   ? Cervical spinal stenosis    ? Coronary artery disease    ? Degenerative disc disease, lumbar    ? Depression    ? Enlarged prostate    ? Hiatal hernia    ? Hyperlipidemia    ? Hypertension    ? Kidney stone    ? Reflux esophagitis    ? Shortness of breath    ? Stroke (H)     Past Surgical History:   Procedure Laterality Date   ? ANGIOPLASTY     ? CAROTID ENDARTERECTOMY Right 5/15/2018    Procedure: RIGHT CAROTID ENDARTERECTOMY WITH NEURO MONITORING;  Surgeon: Domingo Guy MD;  Location: [facility];  Service:    ? CORONARY ARTERY BYPASS GRAFT     ? Multiple stents      , 3/2015   ? Scar present form left carotid endarterectomy      unknown    No family history on file. Social History     Socioeconomic History   ? Marital status:      Spouse name: Not on file   ? Number of children: Not on file   ? Years of education: Not on file   ? Highest education level: Not on file   Occupational History   ? Not on file   Social Needs   ? Financial resource strain: Not on file   ? Food insecurity:     Worry: Not on file     Inability: Not on file   ? Transportation needs:     Medical: Not on file     Non-medical: Not on file   Tobacco Use   ? Smoking status: Former Smoker     Types: Cigarettes     Last attempt to quit: 1971     Years since quittin.5   ? Smokeless tobacco: Never Used   Substance and Sexual Activity   ? Alcohol use: No   ? Drug use:  No   ? Sexual activity: Not on file   Lifestyle   ? Physical activity:     Days per week: Not on file     Minutes per session: Not on file   ? Stress: Not on file   Relationships   ? Social connections:     Talks on phone: Not on file     Gets together: Not on file     Attends Religion service: Not on file     Active member of club or organization: Not on file     Attends meetings of clubs or organizations: Not on file     Relationship status: Not on file   ? Intimate partner violence:     Fear of current or ex partner: Not on file     Emotionally abused: Not on file     Physically abused: Not on file     Forced sexual activity: Not on file   Other Topics Concern   ? Not on file   Social History Narrative   ? Not on file          Medications  Allergies   Current Outpatient Medications   Medication Sig Dispense Refill   ? aspirin 81 MG EC tablet Take 1 tablet (81 mg total) by mouth daily.  0   ? atorvastatin (LIPITOR) 40 MG tablet Take 40 mg by mouth at bedtime.   4   ? chlorhexidine (PERIDEX) 0.12 % solution Apply 15 mL to the mouth or throat 2 (two) times a day.     ? ibuprofen (ADVIL,MOTRIN) 800 MG tablet Take 800 mg by mouth every 8 (eight) hours as needed for pain.      ? metoprolol tartrate (LOPRESSOR) 25 MG tablet Take 1 tablet (25 mg total) by mouth 2 (two) times a day. 60 tablet 0   ? tamsulosin (FLOMAX) 0.4 mg Cp24 Take 0.4 mg by mouth daily after supper.   1   ? nitroglycerin (NITROSTAT) 0.4 MG SL tablet Place 0.4 mg under the tongue every 5 (five) minutes as needed.   1   ? ticagrelor (BRILINTA) 90 mg Tab Take 1 tablet (90 mg total) by mouth 2 (two) times a day. 60 tablet 0     No current facility-administered medications for this visit.       Allergies   Allergen Reactions   ? Oxycodone Unknown   ? Plavix [Clopidogrel] Unknown   ? Vioxx [Rofecoxib] Unknown   ? Morphine Anxiety and Other (See Comments)     Reaction(s): Anxiety and Hallucinations.         Lab Results    Chemistry/lipid CBC Cardiac  Enzymes/BNP/TSH/INR   Lab Results   Component Value Date    CHOL 113 01/23/2019    HDL 35 (L) 01/23/2019    LDLCALC 47 01/23/2019    TRIG 155 (H) 01/23/2019    CREATININE 0.88 01/23/2019    BUN 15 01/23/2019    K 4.2 01/23/2019     01/23/2019     01/23/2019    CO2 27 01/23/2019    Lab Results   Component Value Date    HGB 13.3 (L) 05/15/2018     05/15/2018    No results found for: CKTOTAL, CKMB, CKMBINDEX, TROPONINI, BNP, TSH, INR

## 2021-10-16 ENCOUNTER — HEALTH MAINTENANCE LETTER (OUTPATIENT)
Age: 81
End: 2021-10-16

## 2022-03-15 ENCOUNTER — LAB REQUISITION (OUTPATIENT)
Dept: LAB | Facility: CLINIC | Age: 82
End: 2022-03-15

## 2022-03-15 DIAGNOSIS — I10 ESSENTIAL (PRIMARY) HYPERTENSION: ICD-10-CM

## 2022-03-15 DIAGNOSIS — E78.2 MIXED HYPERLIPIDEMIA: ICD-10-CM

## 2022-03-15 DIAGNOSIS — R31.9 HEMATURIA, UNSPECIFIED: ICD-10-CM

## 2022-03-15 DIAGNOSIS — N48.9 DISORDER OF PENIS, UNSPECIFIED: ICD-10-CM

## 2022-03-15 LAB
ALBUMIN UR-MCNC: 10 MG/DL
ANION GAP SERPL CALCULATED.3IONS-SCNC: 11 MMOL/L (ref 5–18)
APPEARANCE UR: CLEAR
BILIRUB UR QL STRIP: NEGATIVE
BUN SERPL-MCNC: 20 MG/DL (ref 8–28)
CALCIUM SERPL-MCNC: 9.2 MG/DL (ref 8.5–10.5)
CHLORIDE BLD-SCNC: 104 MMOL/L (ref 98–107)
CHOLEST SERPL-MCNC: 105 MG/DL
CO2 SERPL-SCNC: 26 MMOL/L (ref 22–31)
COLOR UR AUTO: YELLOW
CREAT SERPL-MCNC: 1.09 MG/DL (ref 0.7–1.3)
GFR SERPL CREATININE-BSD FRML MDRD: 67 ML/MIN/1.73M2
GLUCOSE BLD-MCNC: 106 MG/DL (ref 70–125)
GLUCOSE UR STRIP-MCNC: NEGATIVE MG/DL
HDLC SERPL-MCNC: 31 MG/DL
HGB UR QL STRIP: NEGATIVE
HYALINE CASTS: 1 /LPF
KETONES UR STRIP-MCNC: NEGATIVE MG/DL
LDLC SERPL CALC-MCNC: 53 MG/DL
LEUKOCYTE ESTERASE UR QL STRIP: NEGATIVE
MUCOUS THREADS #/AREA URNS LPF: PRESENT /LPF
NITRATE UR QL: NEGATIVE
PH UR STRIP: 5.5 [PH] (ref 5–7)
POTASSIUM BLD-SCNC: 4.3 MMOL/L (ref 3.5–5)
RBC URINE: <1 /HPF
SODIUM SERPL-SCNC: 141 MMOL/L (ref 136–145)
SP GR UR STRIP: 1.02 (ref 1–1.03)
SQUAMOUS EPITHELIAL: <1 /HPF
TRIGL SERPL-MCNC: 106 MG/DL
UROBILINOGEN UR STRIP-MCNC: <2 MG/DL
WBC URINE: 2 /HPF

## 2022-03-15 PROCEDURE — 87070 CULTURE OTHR SPECIMN AEROBIC: CPT | Performed by: PHYSICIAN ASSISTANT

## 2022-03-15 PROCEDURE — 87529 HSV DNA AMP PROBE: CPT | Performed by: PHYSICIAN ASSISTANT

## 2022-03-15 PROCEDURE — 82310 ASSAY OF CALCIUM: CPT | Performed by: PHYSICIAN ASSISTANT

## 2022-03-15 PROCEDURE — 81001 URINALYSIS AUTO W/SCOPE: CPT | Performed by: PHYSICIAN ASSISTANT

## 2022-03-15 PROCEDURE — 87205 SMEAR GRAM STAIN: CPT | Performed by: PHYSICIAN ASSISTANT

## 2022-03-15 PROCEDURE — 80061 LIPID PANEL: CPT | Performed by: PHYSICIAN ASSISTANT

## 2022-03-16 LAB
HSV1 DNA SPEC QL NAA+PROBE: NOT DETECTED
HSV2 DNA SPEC QL NAA+PROBE: NOT DETECTED

## 2022-03-17 LAB
BACTERIA SKIN AEROBE CULT: NORMAL
GRAM STAIN RESULT: NORMAL
GRAM STAIN RESULT: NORMAL

## 2022-07-23 ENCOUNTER — HEALTH MAINTENANCE LETTER (OUTPATIENT)
Age: 82
End: 2022-07-23

## 2022-10-01 ENCOUNTER — HEALTH MAINTENANCE LETTER (OUTPATIENT)
Age: 82
End: 2022-10-01

## 2023-02-09 ENCOUNTER — LAB REQUISITION (OUTPATIENT)
Dept: LAB | Facility: CLINIC | Age: 83
End: 2023-02-09

## 2023-02-09 DIAGNOSIS — I10 ESSENTIAL (PRIMARY) HYPERTENSION: ICD-10-CM

## 2023-02-09 DIAGNOSIS — E78.2 MIXED HYPERLIPIDEMIA: ICD-10-CM

## 2023-02-09 LAB
ANION GAP SERPL CALCULATED.3IONS-SCNC: 12 MMOL/L (ref 7–15)
BUN SERPL-MCNC: 18.6 MG/DL (ref 8–23)
CALCIUM SERPL-MCNC: 9.3 MG/DL (ref 8.8–10.2)
CHLORIDE SERPL-SCNC: 105 MMOL/L (ref 98–107)
CHOLEST SERPL-MCNC: 120 MG/DL
CREAT SERPL-MCNC: 1.11 MG/DL (ref 0.67–1.17)
DEPRECATED HCO3 PLAS-SCNC: 26 MMOL/L (ref 22–29)
GFR SERPL CREATININE-BSD FRML MDRD: 65 ML/MIN/1.73M2
GLUCOSE SERPL-MCNC: 116 MG/DL (ref 70–99)
HDLC SERPL-MCNC: 34 MG/DL
LDLC SERPL CALC-MCNC: 54 MG/DL
NONHDLC SERPL-MCNC: 86 MG/DL
POTASSIUM SERPL-SCNC: 4.2 MMOL/L (ref 3.4–5.3)
SODIUM SERPL-SCNC: 143 MMOL/L (ref 136–145)
TRIGL SERPL-MCNC: 161 MG/DL

## 2023-02-09 PROCEDURE — 80048 BASIC METABOLIC PNL TOTAL CA: CPT | Performed by: FAMILY MEDICINE

## 2023-02-09 PROCEDURE — 80061 LIPID PANEL: CPT | Performed by: FAMILY MEDICINE

## 2023-08-06 ENCOUNTER — HEALTH MAINTENANCE LETTER (OUTPATIENT)
Age: 83
End: 2023-08-06

## 2024-02-13 ENCOUNTER — LAB REQUISITION (OUTPATIENT)
Dept: LAB | Facility: CLINIC | Age: 84
End: 2024-02-13

## 2024-02-13 DIAGNOSIS — E78.2 MIXED HYPERLIPIDEMIA: ICD-10-CM

## 2024-02-13 DIAGNOSIS — I10 ESSENTIAL (PRIMARY) HYPERTENSION: ICD-10-CM

## 2024-02-13 LAB
ANION GAP SERPL CALCULATED.3IONS-SCNC: 11 MMOL/L (ref 7–15)
BUN SERPL-MCNC: 17.6 MG/DL (ref 8–23)
CALCIUM SERPL-MCNC: 9.1 MG/DL (ref 8.8–10.2)
CHLORIDE SERPL-SCNC: 103 MMOL/L (ref 98–107)
CHOLEST SERPL-MCNC: 116 MG/DL
CREAT SERPL-MCNC: 1.11 MG/DL (ref 0.67–1.17)
DEPRECATED HCO3 PLAS-SCNC: 26 MMOL/L (ref 22–29)
EGFRCR SERPLBLD CKD-EPI 2021: 65 ML/MIN/1.73M2
FASTING STATUS PATIENT QL REPORTED: ABNORMAL
GLUCOSE SERPL-MCNC: 129 MG/DL (ref 70–99)
HDLC SERPL-MCNC: 36 MG/DL
LDLC SERPL CALC-MCNC: 54 MG/DL
NONHDLC SERPL-MCNC: 80 MG/DL
POTASSIUM SERPL-SCNC: 4.5 MMOL/L (ref 3.4–5.3)
SODIUM SERPL-SCNC: 140 MMOL/L (ref 135–145)
TRIGL SERPL-MCNC: 130 MG/DL

## 2024-02-13 PROCEDURE — 80048 BASIC METABOLIC PNL TOTAL CA: CPT | Performed by: FAMILY MEDICINE

## 2024-02-13 PROCEDURE — 80061 LIPID PANEL: CPT | Performed by: FAMILY MEDICINE

## 2024-06-10 ENCOUNTER — LAB REQUISITION (OUTPATIENT)
Dept: LAB | Facility: CLINIC | Age: 84
End: 2024-06-10

## 2024-06-10 DIAGNOSIS — R21 RASH AND OTHER NONSPECIFIC SKIN ERUPTION: ICD-10-CM

## 2024-06-10 PROCEDURE — 88305 TISSUE EXAM BY PATHOLOGIST: CPT | Performed by: PATHOLOGY

## 2024-06-13 LAB
PATH REPORT.COMMENTS IMP SPEC: NORMAL
PATH REPORT.COMMENTS IMP SPEC: NORMAL
PATH REPORT.FINAL DX SPEC: NORMAL
PATH REPORT.GROSS SPEC: NORMAL
PATH REPORT.MICROSCOPIC SPEC OTHER STN: NORMAL
PATH REPORT.RELEVANT HX SPEC: NORMAL
PHOTO IMAGE: NORMAL

## 2024-07-15 ENCOUNTER — LAB REQUISITION (OUTPATIENT)
Dept: LAB | Facility: CLINIC | Age: 84
End: 2024-07-15

## 2024-07-15 DIAGNOSIS — D04.72 CARCINOMA IN SITU OF SKIN OF LEFT LOWER LIMB, INCLUDING HIP: ICD-10-CM

## 2024-07-15 PROCEDURE — 88305 TISSUE EXAM BY PATHOLOGIST: CPT | Performed by: PATHOLOGY

## 2024-08-13 ENCOUNTER — LAB REQUISITION (OUTPATIENT)
Dept: LAB | Facility: CLINIC | Age: 84
End: 2024-08-13

## 2024-08-13 ENCOUNTER — TRANSFERRED RECORDS (OUTPATIENT)
Dept: HEALTH INFORMATION MANAGEMENT | Facility: CLINIC | Age: 84
End: 2024-08-13

## 2024-08-13 DIAGNOSIS — R06.02 SHORTNESS OF BREATH: ICD-10-CM

## 2024-08-13 PROCEDURE — 80048 BASIC METABOLIC PNL TOTAL CA: CPT | Performed by: STUDENT IN AN ORGANIZED HEALTH CARE EDUCATION/TRAINING PROGRAM

## 2024-08-14 LAB
ANION GAP SERPL CALCULATED.3IONS-SCNC: 11 MMOL/L (ref 7–15)
BUN SERPL-MCNC: 17.1 MG/DL (ref 8–23)
CALCIUM SERPL-MCNC: 8.6 MG/DL (ref 8.8–10.4)
CHLORIDE SERPL-SCNC: 105 MMOL/L (ref 98–107)
CREAT SERPL-MCNC: 1.18 MG/DL (ref 0.67–1.17)
EGFRCR SERPLBLD CKD-EPI 2021: 60 ML/MIN/1.73M2
GLUCOSE SERPL-MCNC: 87 MG/DL (ref 70–99)
HCO3 SERPL-SCNC: 24 MMOL/L (ref 22–29)
POTASSIUM SERPL-SCNC: 4.1 MMOL/L (ref 3.4–5.3)
SODIUM SERPL-SCNC: 140 MMOL/L (ref 135–145)

## 2024-08-15 ENCOUNTER — TRANSFERRED RECORDS (OUTPATIENT)
Dept: HEALTH INFORMATION MANAGEMENT | Facility: CLINIC | Age: 84
End: 2024-08-15

## 2024-08-15 LAB — EJECTION FRACTION: NORMAL %

## 2024-09-27 ENCOUNTER — HOSPITAL ENCOUNTER (OUTPATIENT)
Dept: CARDIOLOGY | Facility: CLINIC | Age: 84
Discharge: HOME | End: 2024-09-27
Attending: FAMILY MEDICINE | Admitting: FAMILY MEDICINE
Payer: COMMERCIAL

## 2024-09-27 DIAGNOSIS — R00.1 SINUS BRADYCARDIA: ICD-10-CM

## 2024-09-27 PROCEDURE — 93225 XTRNL ECG REC<48 HRS REC: CPT

## 2024-09-29 ENCOUNTER — HEALTH MAINTENANCE LETTER (OUTPATIENT)
Age: 84
End: 2024-09-29

## 2024-10-03 PROCEDURE — 93227 XTRNL ECG REC<48 HR R&I: CPT | Performed by: INTERNAL MEDICINE

## 2024-11-11 ENCOUNTER — TRANSFERRED RECORDS (OUTPATIENT)
Dept: HEALTH INFORMATION MANAGEMENT | Facility: CLINIC | Age: 84
End: 2024-11-11
Payer: COMMERCIAL

## 2024-11-20 ENCOUNTER — HOSPITAL ENCOUNTER (EMERGENCY)
Facility: HOSPITAL | Age: 84
Discharge: HOME | End: 2024-11-20
Attending: FAMILY MEDICINE | Admitting: FAMILY MEDICINE
Payer: COMMERCIAL

## 2024-11-20 VITALS
SYSTOLIC BLOOD PRESSURE: 155 MMHG | BODY MASS INDEX: 26.15 KG/M2 | WEIGHT: 156.97 LBS | OXYGEN SATURATION: 96 % | TEMPERATURE: 97.9 F | RESPIRATION RATE: 25 BRPM | HEIGHT: 65 IN | DIASTOLIC BLOOD PRESSURE: 74 MMHG | HEART RATE: 44 BPM

## 2024-11-20 DIAGNOSIS — I50.9 ACUTE CONGESTIVE HEART FAILURE, UNSPECIFIED HEART FAILURE TYPE (H): ICD-10-CM

## 2024-11-20 DIAGNOSIS — R00.1 SYMPTOMATIC BRADYCARDIA: ICD-10-CM

## 2024-11-20 DIAGNOSIS — R06.09 DYSPNEA ON EXERTION: ICD-10-CM

## 2024-11-20 DIAGNOSIS — J90 PLEURAL EFFUSION ON LEFT: ICD-10-CM

## 2024-11-20 DIAGNOSIS — I25.10 CORONARY ARTERY DISEASE INVOLVING NATIVE CORONARY ARTERY OF NATIVE HEART WITHOUT ANGINA PECTORIS: ICD-10-CM

## 2024-11-20 LAB
ANION GAP SERPL CALCULATED.3IONS-SCNC: 9 MMOL/L (ref 7–15)
BASE EXCESS BLDV CALC-SCNC: 1.8 MMOL/L (ref -3–3)
BASOPHILS # BLD AUTO: 0.1 10E3/UL (ref 0–0.2)
BASOPHILS NFR BLD AUTO: 1 %
BUN SERPL-MCNC: 15.4 MG/DL (ref 8–23)
CALCIUM SERPL-MCNC: 8.5 MG/DL (ref 8.8–10.4)
CHLORIDE SERPL-SCNC: 106 MMOL/L (ref 98–107)
CREAT SERPL-MCNC: 1.07 MG/DL (ref 0.67–1.17)
D DIMER PPP FEU-MCNC: 0.91 UG/ML FEU (ref 0–0.5)
EGFRCR SERPLBLD CKD-EPI 2021: 68 ML/MIN/1.73M2
EOSINOPHIL # BLD AUTO: 0.3 10E3/UL (ref 0–0.7)
EOSINOPHIL NFR BLD AUTO: 3 %
ERYTHROCYTE [DISTWIDTH] IN BLOOD BY AUTOMATED COUNT: 12.6 % (ref 10–15)
GLUCOSE SERPL-MCNC: 136 MG/DL (ref 70–99)
HCO3 BLDV-SCNC: 29 MMOL/L (ref 21–28)
HCO3 SERPL-SCNC: 26 MMOL/L (ref 22–29)
HCT VFR BLD AUTO: 36.4 % (ref 40–53)
HGB BLD-MCNC: 11.6 G/DL (ref 13.3–17.7)
IMM GRANULOCYTES # BLD: 0 10E3/UL
IMM GRANULOCYTES NFR BLD: 0 %
LYMPHOCYTES # BLD AUTO: 2.4 10E3/UL (ref 0.8–5.3)
LYMPHOCYTES NFR BLD AUTO: 30 %
MAGNESIUM SERPL-MCNC: 2.1 MG/DL (ref 1.7–2.3)
MCH RBC QN AUTO: 28.6 PG (ref 26.5–33)
MCHC RBC AUTO-ENTMCNC: 31.9 G/DL (ref 31.5–36.5)
MCV RBC AUTO: 90 FL (ref 78–100)
MONOCYTES # BLD AUTO: 0.7 10E3/UL (ref 0–1.3)
MONOCYTES NFR BLD AUTO: 8 %
NEUTROPHILS # BLD AUTO: 4.6 10E3/UL (ref 1.6–8.3)
NEUTROPHILS NFR BLD AUTO: 58 %
NRBC # BLD AUTO: 0 10E3/UL
NRBC BLD AUTO-RTO: 0 /100
NT-PROBNP SERPL-MCNC: 1756 PG/ML (ref 0–1800)
O2/TOTAL GAS SETTING VFR VENT: 21 %
OXYHGB MFR BLDV: 29 % (ref 70–75)
PCO2 BLDV: 58 MM HG (ref 40–50)
PH BLDV: 7.31 [PH] (ref 7.32–7.43)
PLATELET # BLD AUTO: 158 10E3/UL (ref 150–450)
PO2 BLDV: 22 MM HG (ref 25–47)
POTASSIUM SERPL-SCNC: 4.5 MMOL/L (ref 3.4–5.3)
RBC # BLD AUTO: 4.06 10E6/UL (ref 4.4–5.9)
SAO2 % BLDV: 29.3 % (ref 70–75)
SODIUM SERPL-SCNC: 141 MMOL/L (ref 135–145)
TROPONIN T SERPL HS-MCNC: 26 NG/L
TROPONIN T SERPL HS-MCNC: 27 NG/L
WBC # BLD AUTO: 8 10E3/UL (ref 4–11)

## 2024-11-20 PROCEDURE — 82374 ASSAY BLOOD CARBON DIOXIDE: CPT | Performed by: FAMILY MEDICINE

## 2024-11-20 PROCEDURE — 36415 COLL VENOUS BLD VENIPUNCTURE: CPT | Performed by: FAMILY MEDICINE

## 2024-11-20 PROCEDURE — 82805 BLOOD GASES W/O2 SATURATION: CPT | Performed by: FAMILY MEDICINE

## 2024-11-20 PROCEDURE — 250N000011 HC RX IP 250 OP 636: Performed by: FAMILY MEDICINE

## 2024-11-20 PROCEDURE — 85379 FIBRIN DEGRADATION QUANT: CPT | Performed by: FAMILY MEDICINE

## 2024-11-20 PROCEDURE — 84484 ASSAY OF TROPONIN QUANT: CPT | Performed by: FAMILY MEDICINE

## 2024-11-20 PROCEDURE — 83735 ASSAY OF MAGNESIUM: CPT | Performed by: FAMILY MEDICINE

## 2024-11-20 PROCEDURE — 83880 ASSAY OF NATRIURETIC PEPTIDE: CPT | Performed by: FAMILY MEDICINE

## 2024-11-20 PROCEDURE — 85041 AUTOMATED RBC COUNT: CPT | Performed by: FAMILY MEDICINE

## 2024-11-20 PROCEDURE — 96374 THER/PROPH/DIAG INJ IV PUSH: CPT | Mod: 59

## 2024-11-20 PROCEDURE — 99285 EMERGENCY DEPT VISIT HI MDM: CPT | Mod: 25

## 2024-11-20 PROCEDURE — 93005 ELECTROCARDIOGRAM TRACING: CPT | Performed by: STUDENT IN AN ORGANIZED HEALTH CARE EDUCATION/TRAINING PROGRAM

## 2024-11-20 PROCEDURE — 85004 AUTOMATED DIFF WBC COUNT: CPT | Performed by: FAMILY MEDICINE

## 2024-11-20 PROCEDURE — 80048 BASIC METABOLIC PNL TOTAL CA: CPT | Performed by: FAMILY MEDICINE

## 2024-11-20 PROCEDURE — 82310 ASSAY OF CALCIUM: CPT | Performed by: FAMILY MEDICINE

## 2024-11-20 RX ORDER — IOPAMIDOL 755 MG/ML
75 INJECTION, SOLUTION INTRAVASCULAR ONCE
Status: COMPLETED | OUTPATIENT
Start: 2024-11-20 | End: 2024-11-20

## 2024-11-20 RX ORDER — FUROSEMIDE 10 MG/ML
40 INJECTION INTRAMUSCULAR; INTRAVENOUS ONCE
Status: COMPLETED | OUTPATIENT
Start: 2024-11-20 | End: 2024-11-20

## 2024-11-20 RX ORDER — FUROSEMIDE 20 MG/1
20 TABLET ORAL DAILY
Qty: 30 TABLET | Refills: 0 | Status: SHIPPED | OUTPATIENT
Start: 2024-11-20

## 2024-11-20 RX ADMIN — IOPAMIDOL 75 ML: 755 INJECTION, SOLUTION INTRAVENOUS at 20:11

## 2024-11-20 RX ADMIN — FUROSEMIDE 40 MG: 10 INJECTION, SOLUTION INTRAMUSCULAR; INTRAVENOUS at 21:32

## 2024-11-20 ASSESSMENT — ACTIVITIES OF DAILY LIVING (ADL)
ADLS_ACUITY_SCORE: 0

## 2024-11-20 ASSESSMENT — ENCOUNTER SYMPTOMS
SHORTNESS OF BREATH: 1
COUGH: 0

## 2024-11-20 NOTE — ED NOTES
"Expected Patient Referral to ED  4:29 PM    Referring Clinic/Provider:  [facility]    Reason for referral/Clinical facts:  Patient with dyspnea, bradycardia, recent Holter monitor with bradycardia and \"chronotropic incompetence\" but no other abnormalities.  Concern for possible anginal equivalent    Recommendations provided:  Send to ED for further evaluation    Caller was informed that this institution does possess the capabilities and/or resources to provide for patient and should be transferred to our facility.    Discussed that if direct admit is sought and any hurdles are encountered, this ED would be happy to see the patient and evaluate.    Informed caller that recommendations provided are recommendations based only on the facts provided and that they responsible to accept or reject the advice, or to seek a formal in person consultation as needed and that this ED will see/treat patient should they arrive.      Jaguar Bradshaw MD  [facility] EMERGENCY DEPARTMENT  [address] [place] [id number]  [phone]     Jaguar Bradshaw MD  11/20/24 4522    "

## 2024-11-21 NOTE — ED NOTES
Pt is a/o x4, Romanian speaking only, daughter at bedside who interprets for him, pt pain, states mild shortness of breath but is the same PTA, informed pt to call if shortness of breath worsen or having chest pain, on room air, lung sounds clear bilaterally but slightly diminished to RLL, VSS, pt going for CT scan.

## 2024-11-21 NOTE — H&P (VIEW-ONLY)
HEART CARE OUT-PATIENT CONSULTATON NOTE      [facility]  [phone]      Assessment/Recommendations   Assessment: 84 year old male with dyspnea     Plan:  Dyspnea, CHF   Symptoms and findings most suggestive of CHF, though also consider ischemia given abnormal stress in 2019.  Sinus bradycardia in the 40s is unlikely to be the cause with recent holter showing no symptoms and no indications for pacemaker       -As below      -Check TSH    HFpEF  Symptoms and clinical findings suggestive of CHF/volume overload.  Doing better with Lasix added.  Last echocardiogram with preserved EF       -Continue Lasix 20mg, weight 149      -Check BMP and Mag, if creatinine normal can add Lisinopril 5mg       -As below for CAD    CAD  S/p CABG 2015 with stress in 2019 abnormal suggesting issues with grafts to dLAD and ramus.  Patient preferred medical Rx at the time.  Reviewed this finding, the current symptoms and that ischemia may be playing a role.      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  Unlikely this is the source of his dyspnea, no current indication for pacemaker       -Stop Lopressor      -As above for CAD and CHF      -Consider repeat Zio off beta blockers     HTN  Well controlled      -Stop Lopressor for bradycardia       -Continue Cardura 8mg       -If creatinine normal can add Lisinopril for HFpEF     Hyperlipidemia   LDL = 54      -Continue Lipitor 40mg     The longitudinal plan of care for the diagnosis(es)/condition(s) as documented were addressed during this visit. Due to the added complexity in  "care, I will continue to support Kevin in the subsequent management and with ongoing continuity of care.          History of Present Illness/Subjective    Indication for Consult:  I was asked to see Kevin Leblanc by [employer] for dyspnea, bradycardia, CHF, CAD       HPI: Kevin Leblanc is a 84 year old male with a history of HTN, hyperlipidemia, CAD s/p remote CABG who presents for evaluation of dyspnea.  He was seen in the ER 11/20/2024 for dyspnea, hs troponin negative, ECG with mild sinus bradycardia, BNP 1756, CXR with edema.  He was started on lasix and advised to see cardiology.  Last saw Dr Becerra 2019 after abnormal stress test, medical Rx was chosen.    He reports was seeing Dr Kebede and was short of breath and some edema that was worse so was advised to go to the ER.  Since starting Lasix he feels much better, less short of breath, no chest pain, palpitations, pre-syncope, or syncope.    I reviewed notes from ER, PCP prior to this visit.         Physical Examination  Past Cardiac History   Vitals: /52 (BP Location: Right arm, Patient Position: Sitting, Cuff Size: Adult Regular)   Pulse 55   Resp 16   Ht 1.613 m (5' 3.5\")   Wt 67.9 kg (149 lb 9.6 oz)   SpO2 91%   BMI 26.08 kg/m    BMI= Body mass index is 26.08 kg/m .  Wt Readings from Last 3 Encounters:   11/27/24 67.9 kg (149 lb 9.6 oz)   11/20/24 71.2 kg (156 lb 15.5 oz)   08/01/19 72.1 kg (159 lb)       General Appearance:   no distress, normal body habitus   ENT/Mouth: membranes moist, no oral lesions or bleeding gums.      EYES:  no scleral icterus, normal conjunctivae   Neck: no carotid bruits or thyromegaly   Chest/Lungs:   lungs are clear to auscultation, no rales or wheezing,  sternal scar, equal chest wall expansion    Cardiovascular:   Regular. Normal first and second heart sounds with no murmurs, rubs, or gallops; the carotid, radial and posterior tibial pulses are intact, Jugular venous pressure normal, no edema bilaterally "    Abdomen:  no organomegaly, masses, bruits, or tenderness; bowel sounds are present   Extremities: no cyanosis or clubbing   Skin: no xanthelasma, warm.    Neurologic: normal  bilateral, no tremors           CAD: s/p CABG with LIMA to LAD, SVGs to dLAD, ramus, and rPDA 5/2015  HFpEF    Holter: 9/27/2024  PREDOMINANT RHYTHM: Normal sinus rhythm, with normal heart rate response.  CARDIAC CONDUCTION: KS interval, QRS duration, QT interval all appeared to be normal.  No significant bradycardia/pauses.  ATRIAL ARRHYTHMIA: Modestly increased atrial ectopy (2% burden).  No nonsustained ectopic atrial tachycardia. No sustained ectopic atrial tachycardia,  atrial fibrillation, or other SVT.  VENTRICULAR ARRHYTHMIA: Modest increase in ventricular ectopy (burden 5%).  No complex ventricular ectopy or sustained ventricular tachyarrhythmia.  SYMPTOMATIC RECORDINGS: Patient did not report any cardiac symptoms.     IMPRESSION: Borderline 24-hour Holter monitor recording by virtue of some blunting of the patient's heart rate response.  There was however no  evidence of symptoms referable to heart rate.  No symptomatic recordings.     Most Recent Echocardiogram: 7/8/2019    Left ventricle ejection fraction is normal. The estimated left ventricular ejection fraction is 65%.    Normal left ventricular size.    Left atrial volume is mildly increased.    Normal right ventricular size and systolic function.    No hemodynamically significant valvular heart abnormalities.    No previous study for comparison.    Most Recent Stress Test: 7/26/2019    The pharmacologic nuclear stress test is abnormal.    There is a medium sized area of ischemia in the anterior and anterolateral segment(s) of the left ventricle.    The left ventricular ejection fraction is 67%.    Severe resting hypertension noted at baseline.    The patient is at an intermediate risk of future cardiac ischemic events.    There is no prior study available.    Most Recent  Angiogram: 2015    1.  Severe multivessel CAD.        LAD: Severe proximal stenosis, moderate ISR in mid LAD,         distal LAD has 80% stenosis.        Ramus Intermedius: Intermediate proximal lesion,         possible recent ruptured plaque, abnormal IFR 0.85.        RCA: 50% ostial stenosis with multiple moderate         proximal, mid, distal lesions.  Abnormal FFR 0.78.        2.  LVEDP 10mmHg.     ECG (reviewed by myself): 2024 NSR44 bpm, 1st degree AVB ( bpm), incomplete RBBB           Medical History  Family History Social History   CAD  HTN  Hyperlipidemia   No history of premature CAD, sudden death, or cardiomyopathy      Social History     Socioeconomic History    Marital status:      Spouse name: Not on file    Number of children: Not on file    Years of education: Not on file    Highest education level: Not on file   Occupational History    Not on file   Tobacco Use    Smoking status: Former     Current packs/day: 0.00     Average packs/day: 1 pack/day for 26.0 years (26.0 ttl pk-yrs)     Types: Cigarettes     Start date: 1945     Quit date: 1971     Years since quittin.9    Smokeless tobacco: Never   Substance and Sexual Activity    Alcohol use: No    Drug use: No    Sexual activity: Not on file   Other Topics Concern    Not on file   Social History Narrative    Not on file     Social Drivers of Health     Financial Resource Strain: Not on file   Food Insecurity: Not on file   Transportation Needs: Not on file   Physical Activity: Not on file   Stress: Not on file   Social Connections: Not on file   Interpersonal Safety: Not on file   Housing Stability: Not on file           Medications  Allergies   Current Outpatient Medications   Medication Sig Dispense Refill    aspirin 81 MG EC tablet [ASPIRIN 81 MG EC TABLET] Take 1 tablet (81 mg total) by mouth daily.  0    atorvastatin (LIPITOR) 40 MG tablet [ATORVASTATIN (LIPITOR) 40 MG TABLET] Take 40 mg by mouth at bedtime.  "  4    doxazosin (CARDURA) 8 MG tablet Take 1 tablet by mouth daily at 2 pm.      finasteride (PROSCAR) 5 MG tablet Take 1 tablet by mouth daily at 2 pm.      furosemide (LASIX) 20 MG tablet Take 1 tablet (20 mg) by mouth daily. 30 tablet 0    ibuprofen (ADVIL,MOTRIN) 800 MG tablet [IBUPROFEN (ADVIL,MOTRIN) 800 MG TABLET] Take 800 mg by mouth every 8 (eight) hours as needed for pain.       metoprolol tartrate (LOPRESSOR) 25 MG tablet [METOPROLOL TARTRATE (LOPRESSOR) 25 MG TABLET] Take 1 tablet (25 mg total) by mouth 2 (two) times a day. (Patient taking differently: Take 12.5 mg by mouth daily.) 60 tablet 0    nitroglycerin (NITROSTAT) 0.4 MG SL tablet [NITROGLYCERIN (NITROSTAT) 0.4 MG SL TABLET] Place 0.4 mg under the tongue every 5 (five) minutes as needed.   1    OMEGA-3 FATTY ACIDS-VITAMIN E PO Take 1,000 capsules by mouth daily.      tamsulosin (FLOMAX) 0.4 mg Cp24 [TAMSULOSIN (FLOMAX) 0.4 MG CP24] Take 0.4 mg by mouth daily after supper.   1    triamcinolone (KENALOG) 0.1 % external cream Apply topically as needed for irritation.         Allergies   Allergen Reactions    Nsaids Anaphylaxis and Unknown    Oxycodone Unknown    Plavix [Clopidogrel] Unknown    Vioxx [Rofecoxib] Unknown    Morphine Anxiety and Other (See Comments)     Reaction(s): Anxiety and Hallucinations.          Lab Results    Chemistry/lipid CBC Cardiac Enzymes/BNP/TSH/INR   Recent Labs   Lab Test 02/13/24  1108   CHOL 116   HDL 36*   LDL 54   TRIG 130     Recent Labs   Lab Test 02/13/24  1108 02/09/23  1106 03/15/22  1451   LDL 54 54 53     Recent Labs   Lab Test 11/20/24  1849      POTASSIUM 4.5   CHLORIDE 106   CO2 26   *   BUN 15.4   CR 1.07   GFRESTIMATED 68   KEEGAN 8.5*     Recent Labs   Lab Test 11/20/24  1849 08/13/24  1619 02/13/24  1108   CR 1.07 1.18* 1.11     No results for input(s): \"A1C\" in the last 48561 hours.       Recent Labs   Lab Test 11/20/24  1849   WBC 8.0   HGB 11.6*   HCT 36.4*   MCV 90        Recent " "Labs   Lab Test 11/20/24 1849 07/05/19  1130 05/15/18  0735   HGB 11.6* 15.0 13.3*    No results for input(s): \"TROPONINI\" in the last 83394 hours.  Recent Labs   Lab Test 11/20/24 1849 07/05/19  1130   BNP  --  101*   NTBNPI 1,756  --      No results for input(s): \"TSH\" in the last 46248 hours.  No results for input(s): \"INR\" in the last 35825 hours.     Shantelle Garcia MD  Noninvasive Cardiologist   [facility]                                    "

## 2024-11-21 NOTE — ED NOTES
Willie arrives with daughter for evaluation of intermittent SOB for several months.  Spoke with cardiology and was advised to come to ED due to bradycardia and potential need for pacemaker.  History of CABG.

## 2024-11-21 NOTE — ED PROVIDER NOTES
EMERGENCY DEPARTMENT ENCOUNTER      NAME: Kevin Leblanc  AGE: 84 year old male  YOB: 1940  MRN: [id number]  EVALUATION DATE & TIME: No admission date for patient encounter.    PCP: Kieran Griffin    ED PROVIDER: Jaguar Bradshaw M.D.    Chief Complaint   Patient presents with    Bradycardia       FINAL IMPRESSION:  1. Symptomatic bradycardia    2. Dyspnea on exertion    3. Pleural effusion on left    4. Acute congestive heart failure, unspecified heart failure type (H)    5. Coronary artery disease involving native coronary artery of native heart without angina pectoris        ED COURSE & MEDICAL DECISION MAKING:    Pertinent Labs & Imaging studies independently interpreted by me. (See chart for details)  Reviewed cardiology notes from today, patient was sent for increasing dyspnea on exertion for the last couple of months, seems to be worse in the last week or so.  Did have a Holter monitor September 2024, I reviewed the results of this which demonstrated no significant bradycardia or pauses, no dysrhythmia    ED Course as of 11/20/24 2126 Wed Nov 20, 2024   1858 EKG:    Independently reviewed and interpreted by me  Performed at: 6:50 PM  Impression: Sinus bradycardia with first-degree AV block, no acute ischemic changes  Rate: 44  Rhythm: Sinus  Axis: Normal  AL Interval: 296  QRS Interval: 82  QTc Interval: 442  ST Changes: No acute ischemic changes  Comparison: May 2018, RSR in V1 is now present, AL interval is increased   1911 Patient seen and examined with daughter interpreting and providing additional history.  Patient has had increased shortness of breath for the last week or so but even prior to this was having dyspnea with exertion.  Does have bradycardia but was a little bit bradycardic on prior EKG of 2014, recent Holter monitor with bradycardia as well.  On exam here, patient is bradycardic but appears comfortable.  He does have crackles bilaterally on lung exam.  Concern for  possible heart failure contributing to worsening symptoms today.  No chest pain, acute coronary syndrome is possible but less likely.  Discussed likely recommendation for hospital admission for further cardiac evaluation today, patient and family declined this initially but if patient does have evidence for acute myocardial infarction would reconsider.   1934 Labs independently interpreted by me with elevated D-dimer, CT PE study is ordered.  Troponin 26 which will be rechecked, normal basic panel, BNP 1756 which is negative.   1934 Chest x-ray independently interpreted by me with cardiomegaly and question of some increased pulmonary vascular markings particularly on the right but no acute effusion   2032 CT scan of the chest independently interpreted by me without pulmonary embolism or evidence of aortic pathology, there are bilateral pleural effusions worse on the right along with diffuse groundglass opacities.   2108 Radiology interpretation of CT agrees with my initial interpretation.  Patient will be started on Lasix, needs to follow-up with primary care and cardiology.  Again recommended admission for further evaluation, patient and family decline.  Discussed risks of discharge and return to emergency department recommendations.  I was able to find most recent echocardiogram from August 2024 which did not demonstrate any wall motion abnormalities, ejection fraction 55 to 60%, restrictive filling abnormality, preserved right ventricular function.  Patient with significant cardiovascular disease including bypass and stenting, increased shortness of breath for several months but worse in the past week.  On examination here, no hypoxia, patient found to be bradycardic which has been a consistent finding in recently.  CT scan does demonstrate findings consistent with active congestive heart failure.  Patient declines admission, will be started on diuretics in the emergency department and plan to discharge with  close outpatient follow-up.     2113 Repeat troponin is stable.         At the conclusion of the encounter I discussed the results of all of the tests and the disposition. The questions were answered. The patient or family acknowledged understanding and was agreeable with the care plan.     Medical Decision Making  Obtained supplemental history:Supplemental history obtained?: Family Member/Significant Other  Reviewed external records: External records reviewed?: Documented in chart  Care impacted by chronic illness:Heart Disease  Did you consider but not order tests?: Work up considered but not performed and documented in chart, if applicable  Did you interpret images independently?: Independent interpretation of ECG and images noted in documentation, when applicable.  Consultation discussion with other provider:Did you involve another provider (consultant, , pharmacy, etc.)?: No  Discharge. I prescribed additional prescription strength medication(s) as charted. I recommended admission, but the patient declined.    MIPS: CT Pulmonary Angiogram:Patient is moderate to high risk for PE., The patient had an abnormal d-dimer., and CT PA was ordered for reason(s) other than PE.      MEDICATIONS GIVEN IN THE EMERGENCY:  Medications   furosemide (LASIX) injection 40 mg (has no administration in time range)   iopamidol (ISOVUE-370) solution 75 mL (75 mLs Intravenous $Given 11/20/24 2011)       NEW PRESCRIPTIONS STARTED AT TODAY'S ER VISIT  New Prescriptions    FUROSEMIDE (LASIX) 20 MG TABLET    Take 1 tablet (20 mg) by mouth daily.       =================================================================    HPI    Patient information was obtained from: patient and family member   Interpretor (daughter): Wes Leblanc is a 84 year old male with a pertinent history of carotid arterial disease and coronary artery disease involving native coronary artery without angina pectoris who presents to this ED by referral  for evaluation of shortness of breath and brachycardia.     Per the patient, he was seen at the cardiologist today and was referred to the emergency department for additional cardiovascular testing. He is presenting with intermittent shortness of breath and brachycardia. Shortness of breath increases with increased activity. Endorses difficulty climbing stairs due to shortness of breath and fatigue. Denies cough and chest pain. Quit smoking in 1971.     Per daughter, the shortness of breath has been present for awhile, but has never been this severe. He generally has swollen legs/calves, so there is no noticeable difference between before and his current conditions. History of a quadruple bypass in 2013.     Of note, the patient speaks a different dialect than the translators and needs his daughters present for interpretation.     Per Chart Review, the patient had his most recent Holter Monitor evaluation on 9/27/24 at [facility]. Normal sinus rhythm with normal heart response. Normal OK interval, QRS duration, and QT interval. No significant brachycardia/pauses.       REVIEW OF SYSTEMS   Review of Systems   Respiratory:  Positive for shortness of breath. Negative for cough.    Cardiovascular:  Negative for chest pain.        Brachycardia      All other systems reviewed and negative    PAST MEDICAL HISTORY:  No past medical history on file.    PAST SURGICAL HISTORY:  Past Surgical History:   Procedure Laterality Date    ANGIOPLASTY  2001    BYPASS GRAFT ARTERY CORONARY  2015    CAROTID ENDARTERECTOMY Right 5/15/2018    Procedure: RIGHT CAROTID ENDARTERECTOMY WITH NEURO MONITORING;  Surgeon: Domingo Guy MD;  Location: [facility];  Service:     OTHER SURGICAL HISTORY      Scar present form left carotid endarterectomyunknown    OTHER SURGICAL HISTORY      Multiple [id number], 3/2015       CURRENT MEDICATIONS:    Current Facility-Administered Medications   Medication  Dose Route Frequency Provider Last Rate Last Admin    furosemide (LASIX) injection 40 mg  40 mg Intravenous Once Jaguar Bradshaw MD         Current Outpatient Medications   Medication Sig Dispense Refill    furosemide (LASIX) 20 MG tablet Take 1 tablet (20 mg) by mouth daily. 30 tablet 0    aspirin 81 MG EC tablet [ASPIRIN 81 MG EC TABLET] Take 1 tablet (81 mg total) by mouth daily.  0    atorvastatin (LIPITOR) 40 MG tablet [ATORVASTATIN (LIPITOR) 40 MG TABLET] Take 40 mg by mouth at bedtime.   4    chlorhexidine (PERIDEX) 0.12 % solution [CHLORHEXIDINE (PERIDEX) 0.12 % SOLUTION] Apply 15 mL to the mouth or throat 2 (two) times a day.      ibuprofen (ADVIL,MOTRIN) 800 MG tablet [IBUPROFEN (ADVIL,MOTRIN) 800 MG TABLET] Take 800 mg by mouth every 8 (eight) hours as needed for pain.       metoprolol tartrate (LOPRESSOR) 25 MG tablet [METOPROLOL TARTRATE (LOPRESSOR) 25 MG TABLET] Take 1 tablet (25 mg total) by mouth 2 (two) times a day. 60 tablet 0    nitroglycerin (NITROSTAT) 0.4 MG SL tablet [NITROGLYCERIN (NITROSTAT) 0.4 MG SL TABLET] Place 0.4 mg under the tongue every 5 (five) minutes as needed.   1    tamsulosin (FLOMAX) 0.4 mg Cp24 [TAMSULOSIN (FLOMAX) 0.4 MG CP24] Take 0.4 mg by mouth daily after supper.   1    ticagrelor (BRILINTA) 90 mg Tab [TICAGRELOR (BRILINTA) 90 MG TAB] Take 1 tablet (90 mg total) by mouth 2 (two) times a day. 60 tablet 0       ALLERGIES:  Allergies   Allergen Reactions    Oxycodone Unknown    Plavix [Clopidogrel] Unknown    Vioxx [Rofecoxib] Unknown    Morphine Anxiety and Other (See Comments)     Reaction(s): Anxiety and Hallucinations.       FAMILY HISTORY:  No family history on file.    SOCIAL HISTORY:   Social History     Socioeconomic History    Marital status:    Tobacco Use    Smoking status: Former     Current packs/day: 0.00     Average packs/day: 1 pack/day for 26.0 years (26.0 ttl pk-yrs)     Types: Cigarettes     Start date: 1/1/1945     Quit date: 1/1/1971      "Years since quittin.9    Smokeless tobacco: Never   Substance and Sexual Activity    Alcohol use: No    Drug use: No       VITALS:  BP (!) 162/71   Pulse (!) 45   Temp 97.9  F (36.6  C)   Resp 19   Ht 1.651 m (5' 5\")   Wt 71.2 kg (156 lb 15.5 oz)   SpO2 95%   BMI 26.12 kg/m      PHYSICAL EXAM:  Physical Exam  Vitals and nursing note reviewed.   Constitutional:       Appearance: Normal appearance.   HENT:      Head: Normocephalic and atraumatic.      Right Ear: External ear normal.      Left Ear: External ear normal.      Nose: Nose normal.      Mouth/Throat:      Mouth: Mucous membranes are moist.   Eyes:      Extraocular Movements: Extraocular movements intact.      Conjunctiva/sclera: Conjunctivae normal.      Pupils: Pupils are equal, round, and reactive to light.   Cardiovascular:      Rate and Rhythm: Regular rhythm. Bradycardia present.   Pulmonary:      Effort: Pulmonary effort is normal.      Breath sounds: Normal breath sounds. No wheezing or rales.      Comments: Crackles in lungs bilaterally  Abdominal:      General: Abdomen is flat. There is no distension.      Palpations: Abdomen is soft.      Tenderness: There is no abdominal tenderness. There is no guarding.   Musculoskeletal:         General: Normal range of motion.      Cervical back: Normal range of motion and neck supple.      Right lower leg: No edema.      Left lower leg: No edema.   Lymphadenopathy:      Cervical: No cervical adenopathy.   Skin:     General: Skin is warm and dry.   Neurological:      General: No focal deficit present.      Mental Status: He is alert and oriented to person, place, and time. Mental status is at baseline.      Comments: No gross focal neurologic deficits   Psychiatric:         Mood and Affect: Mood normal.         Behavior: Behavior normal.         Thought Content: Thought content normal.          LAB:  All pertinent labs reviewed and interpreted.  Results for orders placed or performed during the " hospital encounter of 11/20/24   Chest XR,  PA & LAT    Impression    IMPRESSION: Stable chest with no significant changes with numerous nonacute incidental findings. No active CHF, lung infiltrates, or pneumothorax identified.   CT Chest Pulmonary Embolism w Contrast    Impression    IMPRESSION:  1.  No PE, dissection, or aneurysm.    2.  Changes worrisome for active CHF.    3.  Cardiomegaly.    4.  Mild nonspecific mosaic perfusion which can be associated air trapping secondary to small airway inflammation.    5.  Advanced coronary artery calcification.   Basic metabolic panel   Result Value Ref Range    Sodium 141 135 - 145 mmol/L    Potassium 4.5 3.4 - 5.3 mmol/L    Chloride 106 98 - 107 mmol/L    Carbon Dioxide (CO2) 26 22 - 29 mmol/L    Anion Gap 9 7 - 15 mmol/L    Urea Nitrogen 15.4 8.0 - 23.0 mg/dL    Creatinine 1.07 0.67 - 1.17 mg/dL    GFR Estimate 68 >60 mL/min/1.73m2    Calcium 8.5 (L) 8.8 - 10.4 mg/dL    Glucose 136 (H) 70 - 99 mg/dL   Result Value Ref Range    Troponin T, High Sensitivity 26 (H) <=22 ng/L   Result Value Ref Range    Magnesium 2.1 1.7 - 2.3 mg/dL   Blood gas venous   Result Value Ref Range    pH Venous 7.31 (L) 7.32 - 7.43    pCO2 Venous 58 (H) 40 - 50 mm Hg    pO2 Venous 22 (L) 25 - 47 mm Hg    Bicarbonate Venous 29 (H) 21 - 28 mmol/L    Base Excess/Deficit Venous 1.8 -3.0 - 3.0 mmol/L    FIO2 21     Oxyhemoglobin Venous 29 (L) 70 - 75 %    O2 Sat, Venous 29.3 (L) 70.0 - 75.0 %   Nt probnp inpatient (BNP)   Result Value Ref Range    N terminal Pro BNP Inpatient 1,756 0 - 1,800 pg/mL   CBC with platelets and differential   Result Value Ref Range    WBC Count 8.0 4.0 - 11.0 10e3/uL    RBC Count 4.06 (L) 4.40 - 5.90 10e6/uL    Hemoglobin 11.6 (L) 13.3 - 17.7 g/dL    Hematocrit 36.4 (L) 40.0 - 53.0 %    MCV 90 78 - 100 fL    MCH 28.6 26.5 - 33.0 pg    MCHC 31.9 31.5 - 36.5 g/dL    RDW 12.6 10.0 - 15.0 %    Platelet Count 158 150 - 450 10e3/uL    % Neutrophils 58 %    % Lymphocytes 30 %     % Monocytes 8 %    % Eosinophils 3 %    % Basophils 1 %    % Immature Granulocytes 0 %    NRBCs per 100 WBC 0 <1 /100    Absolute Neutrophils 4.6 1.6 - 8.3 10e3/uL    Absolute Lymphocytes 2.4 0.8 - 5.3 10e3/uL    Absolute Monocytes 0.7 0.0 - 1.3 10e3/uL    Absolute Eosinophils 0.3 0.0 - 0.7 10e3/uL    Absolute Basophils 0.1 0.0 - 0.2 10e3/uL    Absolute Immature Granulocytes 0.0 <=0.4 10e3/uL    Absolute NRBCs 0.0 10e3/uL   D dimer quantitative   Result Value Ref Range    D-Dimer Quantitative 0.91 (H) 0.00 - 0.50 ug/mL FEU   Result Value Ref Range    Troponin T, High Sensitivity 27 (H) <=22 ng/L       RADIOLOGY:  Reviewed all pertinent imaging. Please see official radiology report.  CT Chest Pulmonary Embolism w Contrast   Final Result   IMPRESSION:   1.  No PE, dissection, or aneurysm.      2.  Changes worrisome for active CHF.      3.  Cardiomegaly.      4.  Mild nonspecific mosaic perfusion which can be associated air trapping secondary to small airway inflammation.      5.  Advanced coronary artery calcification.      Chest XR,  PA & LAT   Final Result   IMPRESSION: Stable chest with no significant changes with numerous nonacute incidental findings. No active CHF, lung infiltrates, or pneumothorax identified.          I, Prashanth Feldman, am serving as a scribe to document services personally performed by Dr. Bradshaw based on my observation and the provider's statements to me. I, Jaguar Bradshaw MD attest that Prashanth Feldman is acting in a scribe capacity, has observed my performance of the services and has documented them in accordance with my direction.    Jaguar Bradshaw M.D.  Emergency Medicine  [facility] EMERGENCY DEPARTMENT  [address] [address] [phone]  Dept: [phone]       Jaguar Bradshaw MD  11/20/24 4706

## 2024-11-21 NOTE — PROGRESS NOTES
HEART CARE OUT-PATIENT CONSULTATON NOTE      [facility]  [phone]      Assessment/Recommendations   Assessment: 84 year old male with dyspnea     Plan:  Dyspnea, CHF   Symptoms and findings most suggestive of CHF, though also consider ischemia given abnormal stress in 2019.  Sinus bradycardia in the 40s is unlikely to be the cause with recent holter showing no symptoms and no indications for pacemaker       -As below      -Check TSH    HFpEF  Symptoms and clinical findings suggestive of CHF/volume overload.  Doing better with Lasix added.  Last echocardiogram with preserved EF       -Continue Lasix 20mg, weight 149      -Check BMP and Mag, if creatinine normal can add Lisinopril 5mg       -As below for CAD    CAD  S/p CABG 2015 with stress in 2019 abnormal suggesting issues with grafts to dLAD and ramus.  Patient preferred medical Rx at the time.  Reviewed this finding, the current symptoms and that ischemia may be playing a role.      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  Unlikely this is the source of his dyspnea, no current indication for pacemaker       -Stop Lopressor      -As above for CAD and CHF      -Consider repeat Zio off beta blockers     HTN  Well controlled      -Stop Lopressor for bradycardia       -Continue Cardura 8mg       -If creatinine normal can add Lisinopril for HFpEF     Hyperlipidemia   LDL = 54      -Continue Lipitor 40mg     The longitudinal plan of care for the diagnosis(es)/condition(s) as documented were addressed during this visit. Due to the added complexity in  "care, I will continue to support Kevin in the subsequent management and with ongoing continuity of care.          History of Present Illness/Subjective    Indication for Consult:  I was asked to see Kevin Leblanc by Jaguar Vivas for dyspnea, bradycardia, CHF, CAD       HPI: Kevin Leblanc is a 84 year old male with a history of HTN, hyperlipidemia, CAD s/p remote CABG who presents for evaluation of dyspnea.  He was seen in the ER 11/20/2024 for dyspnea, hs troponin negative, ECG with mild sinus bradycardia, BNP 1756, CXR with edema.  He was started on lasix and advised to see cardiology.  Last saw Dr Becerra 2019 after abnormal stress test, medical Rx was chosen.    He reports was seeing Dr Kebede and was short of breath and some edema that was worse so was advised to go to the ER.  Since starting Lasix he feels much better, less short of breath, no chest pain, palpitations, pre-syncope, or syncope.    I reviewed notes from ER, PCP prior to this visit.         Physical Examination  Past Cardiac History   Vitals: /52 (BP Location: Right arm, Patient Position: Sitting, Cuff Size: Adult Regular)   Pulse 55   Resp 16   Ht 1.613 m (5' 3.5\")   Wt 67.9 kg (149 lb 9.6 oz)   SpO2 91%   BMI 26.08 kg/m    BMI= Body mass index is 26.08 kg/m .  Wt Readings from Last 3 Encounters:   11/27/24 67.9 kg (149 lb 9.6 oz)   11/20/24 71.2 kg (156 lb 15.5 oz)   08/01/19 72.1 kg (159 lb)       General Appearance:   no distress, normal body habitus   ENT/Mouth: membranes moist, no oral lesions or bleeding gums.      EYES:  no scleral icterus, normal conjunctivae   Neck: no carotid bruits or thyromegaly   Chest/Lungs:   lungs are clear to auscultation, no rales or wheezing,  sternal scar, equal chest wall expansion    Cardiovascular:   Regular. Normal first and second heart sounds with no murmurs, rubs, or gallops; the carotid, radial and posterior tibial pulses are intact, Jugular venous pressure normal, no edema bilaterally "    Abdomen:  no organomegaly, masses, bruits, or tenderness; bowel sounds are present   Extremities: no cyanosis or clubbing   Skin: no xanthelasma, warm.    Neurologic: normal  bilateral, no tremors           CAD: s/p CABG with LIMA to LAD, SVGs to dLAD, ramus, and rPDA 5/2015  HFpEF    Holter: 9/27/2024  PREDOMINANT RHYTHM: Normal sinus rhythm, with normal heart rate response.  CARDIAC CONDUCTION: PA interval, QRS duration, QT interval all appeared to be normal.  No significant bradycardia/pauses.  ATRIAL ARRHYTHMIA: Modestly increased atrial ectopy (2% burden).  No nonsustained ectopic atrial tachycardia. No sustained ectopic atrial tachycardia,  atrial fibrillation, or other SVT.  VENTRICULAR ARRHYTHMIA: Modest increase in ventricular ectopy (burden 5%).  No complex ventricular ectopy or sustained ventricular tachyarrhythmia.  SYMPTOMATIC RECORDINGS: Patient did not report any cardiac symptoms.     IMPRESSION: Borderline 24-hour Holter monitor recording by virtue of some blunting of the patient's heart rate response.  There was however no  evidence of symptoms referable to heart rate.  No symptomatic recordings.     Most Recent Echocardiogram: 7/8/2019    Left ventricle ejection fraction is normal. The estimated left ventricular ejection fraction is 65%.    Normal left ventricular size.    Left atrial volume is mildly increased.    Normal right ventricular size and systolic function.    No hemodynamically significant valvular heart abnormalities.    No previous study for comparison.    Most Recent Stress Test: 7/26/2019    The pharmacologic nuclear stress test is abnormal.    There is a medium sized area of ischemia in the anterior and anterolateral segment(s) of the left ventricle.    The left ventricular ejection fraction is 67%.    Severe resting hypertension noted at baseline.    The patient is at an intermediate risk of future cardiac ischemic events.    There is no prior study available.    Most Recent  Angiogram: 2015    1.  Severe multivessel CAD.        LAD: Severe proximal stenosis, moderate ISR in mid LAD,         distal LAD has 80% stenosis.        Ramus Intermedius: Intermediate proximal lesion,         possible recent ruptured plaque, abnormal IFR 0.85.        RCA: 50% ostial stenosis with multiple moderate         proximal, mid, distal lesions.  Abnormal FFR 0.78.        2.  LVEDP 10mmHg.     ECG (reviewed by myself): 2024 NSR44 bpm, 1st degree AVB ( bpm), incomplete RBBB           Medical History  Family History Social History   CAD  HTN  Hyperlipidemia   No history of premature CAD, sudden death, or cardiomyopathy      Social History     Socioeconomic History    Marital status:      Spouse name: Not on file    Number of children: Not on file    Years of education: Not on file    Highest education level: Not on file   Occupational History    Not on file   Tobacco Use    Smoking status: Former     Current packs/day: 0.00     Average packs/day: 1 pack/day for 26.0 years (26.0 ttl pk-yrs)     Types: Cigarettes     Start date: 1945     Quit date: 1971     Years since quittin.9    Smokeless tobacco: Never   Substance and Sexual Activity    Alcohol use: No    Drug use: No    Sexual activity: Not on file   Other Topics Concern    Not on file   Social History Narrative    Not on file     Social Drivers of Health     Financial Resource Strain: Not on file   Food Insecurity: Not on file   Transportation Needs: Not on file   Physical Activity: Not on file   Stress: Not on file   Social Connections: Not on file   Interpersonal Safety: Not on file   Housing Stability: Not on file           Medications  Allergies   Current Outpatient Medications   Medication Sig Dispense Refill    aspirin 81 MG EC tablet [ASPIRIN 81 MG EC TABLET] Take 1 tablet (81 mg total) by mouth daily.  0    atorvastatin (LIPITOR) 40 MG tablet [ATORVASTATIN (LIPITOR) 40 MG TABLET] Take 40 mg by mouth at bedtime.  "  4    doxazosin (CARDURA) 8 MG tablet Take 1 tablet by mouth daily at 2 pm.      finasteride (PROSCAR) 5 MG tablet Take 1 tablet by mouth daily at 2 pm.      furosemide (LASIX) 20 MG tablet Take 1 tablet (20 mg) by mouth daily. 30 tablet 0    ibuprofen (ADVIL,MOTRIN) 800 MG tablet [IBUPROFEN (ADVIL,MOTRIN) 800 MG TABLET] Take 800 mg by mouth every 8 (eight) hours as needed for pain.       metoprolol tartrate (LOPRESSOR) 25 MG tablet [METOPROLOL TARTRATE (LOPRESSOR) 25 MG TABLET] Take 1 tablet (25 mg total) by mouth 2 (two) times a day. (Patient taking differently: Take 12.5 mg by mouth daily.) 60 tablet 0    nitroglycerin (NITROSTAT) 0.4 MG SL tablet [NITROGLYCERIN (NITROSTAT) 0.4 MG SL TABLET] Place 0.4 mg under the tongue every 5 (five) minutes as needed.   1    OMEGA-3 FATTY ACIDS-VITAMIN E PO Take 1,000 capsules by mouth daily.      tamsulosin (FLOMAX) 0.4 mg Cp24 [TAMSULOSIN (FLOMAX) 0.4 MG CP24] Take 0.4 mg by mouth daily after supper.   1    triamcinolone (KENALOG) 0.1 % external cream Apply topically as needed for irritation.         Allergies   Allergen Reactions    Nsaids Anaphylaxis and Unknown    Oxycodone Unknown    Plavix [Clopidogrel] Unknown    Vioxx [Rofecoxib] Unknown    Morphine Anxiety and Other (See Comments)     Reaction(s): Anxiety and Hallucinations.          Lab Results    Chemistry/lipid CBC Cardiac Enzymes/BNP/TSH/INR   Recent Labs   Lab Test 02/13/24  1108   CHOL 116   HDL 36*   LDL 54   TRIG 130     Recent Labs   Lab Test 02/13/24  1108 02/09/23  1106 03/15/22  1451   LDL 54 54 53     Recent Labs   Lab Test 11/20/24  1849      POTASSIUM 4.5   CHLORIDE 106   CO2 26   *   BUN 15.4   CR 1.07   GFRESTIMATED 68   KEEGAN 8.5*     Recent Labs   Lab Test 11/20/24  1849 08/13/24  1619 02/13/24  1108   CR 1.07 1.18* 1.11     No results for input(s): \"A1C\" in the last 35885 hours.       Recent Labs   Lab Test 11/20/24  1849   WBC 8.0   HGB 11.6*   HCT 36.4*   MCV 90        Recent " "Labs   Lab Test 11/20/24 1849 07/05/19  1130 05/15/18  0735   HGB 11.6* 15.0 13.3*    No results for input(s): \"TROPONINI\" in the last 23068 hours.  Recent Labs   Lab Test 11/20/24 1849 07/05/19  1130   BNP  --  101*   NTBNPI 1,756  --      No results for input(s): \"TSH\" in the last 16274 hours.  No results for input(s): \"INR\" in the last 66594 hours.     Shantelle Garcia MD  Noninvasive Cardiologist   [facility]                                    "

## 2024-11-21 NOTE — ED TRIAGE NOTES
Pt was at the cardiology clinic today he is having intermittent dyspnea and bradycardia.        Triage Assessment (Adult)       Row Name 11/20/24 4058          Triage Assessment    Airway WDL WDL        Respiratory WDL    Respiratory WDL WDL        Skin Circulation/Temperature WDL    Skin Circulation/Temperature WDL WDL        Cardiac WDL    Cardiac WDL X     Cardiac Rhythm SB        Peripheral/Neurovascular WDL    Peripheral Neurovascular WDL WDL        Cognitive/Neuro/Behavioral WDL    Cognitive/Neuro/Behavioral WDL WDL

## 2024-11-26 LAB
ATRIAL RATE - MUSE: 44 BPM
DIASTOLIC BLOOD PRESSURE - MUSE: NORMAL MMHG
INTERPRETATION ECG - MUSE: NORMAL
P AXIS - MUSE: 77 DEGREES
PR INTERVAL - MUSE: 296 MS
QRS DURATION - MUSE: 82 MS
QT - MUSE: 518 MS
QTC - MUSE: 442 MS
R AXIS - MUSE: 96 DEGREES
SYSTOLIC BLOOD PRESSURE - MUSE: NORMAL MMHG
T AXIS - MUSE: 25 DEGREES
VENTRICULAR RATE- MUSE: 44 BPM

## 2024-11-27 ENCOUNTER — OFFICE VISIT (OUTPATIENT)
Dept: CARDIOLOGY | Facility: CLINIC | Age: 84
End: 2024-11-27
Attending: FAMILY MEDICINE
Payer: COMMERCIAL

## 2024-11-27 VITALS
OXYGEN SATURATION: 91 % | DIASTOLIC BLOOD PRESSURE: 52 MMHG | BODY MASS INDEX: 25.54 KG/M2 | HEART RATE: 55 BPM | WEIGHT: 149.6 LBS | SYSTOLIC BLOOD PRESSURE: 120 MMHG | RESPIRATION RATE: 16 BRPM | HEIGHT: 64 IN

## 2024-11-27 DIAGNOSIS — I25.10 CORONARY ARTERY DISEASE INVOLVING NATIVE CORONARY ARTERY OF NATIVE HEART WITHOUT ANGINA PECTORIS: ICD-10-CM

## 2024-11-27 DIAGNOSIS — I50.31 ACUTE DIASTOLIC CONGESTIVE HEART FAILURE (H): Primary | ICD-10-CM

## 2024-11-27 DIAGNOSIS — I34.0 NONRHEUMATIC MITRAL VALVE REGURGITATION: ICD-10-CM

## 2024-11-27 DIAGNOSIS — R06.09 DOE (DYSPNEA ON EXERTION): ICD-10-CM

## 2024-11-27 DIAGNOSIS — I49.3 PVC'S (PREMATURE VENTRICULAR CONTRACTIONS): ICD-10-CM

## 2024-11-27 DIAGNOSIS — E78.5 HYPERLIPIDEMIA LDL GOAL <70: ICD-10-CM

## 2024-11-27 DIAGNOSIS — R00.1 SINUS BRADYCARDIA: ICD-10-CM

## 2024-11-27 DIAGNOSIS — I10 PRIMARY HYPERTENSION: ICD-10-CM

## 2024-11-27 LAB
ANION GAP SERPL CALCULATED.3IONS-SCNC: 10 MMOL/L (ref 7–15)
BUN SERPL-MCNC: 15.9 MG/DL (ref 8–23)
CALCIUM SERPL-MCNC: 8.7 MG/DL (ref 8.8–10.4)
CHLORIDE SERPL-SCNC: 103 MMOL/L (ref 98–107)
CREAT SERPL-MCNC: 1.16 MG/DL (ref 0.67–1.17)
EGFRCR SERPLBLD CKD-EPI 2021: 61 ML/MIN/1.73M2
GLUCOSE SERPL-MCNC: 120 MG/DL (ref 70–99)
HCO3 SERPL-SCNC: 27 MMOL/L (ref 22–29)
MAGNESIUM SERPL-MCNC: 2.2 MG/DL (ref 1.7–2.3)
POTASSIUM SERPL-SCNC: 4.1 MMOL/L (ref 3.4–5.3)
SODIUM SERPL-SCNC: 140 MMOL/L (ref 135–145)
T4 FREE SERPL-MCNC: 1.16 NG/DL (ref 0.9–1.7)
TSH SERPL DL<=0.005 MIU/L-ACNC: 4.23 UIU/ML (ref 0.3–4.2)

## 2024-11-27 PROCEDURE — 83735 ASSAY OF MAGNESIUM: CPT | Performed by: INTERNAL MEDICINE

## 2024-11-27 PROCEDURE — 84439 ASSAY OF FREE THYROXINE: CPT | Performed by: INTERNAL MEDICINE

## 2024-11-27 PROCEDURE — 36415 COLL VENOUS BLD VENIPUNCTURE: CPT | Performed by: INTERNAL MEDICINE

## 2024-11-27 PROCEDURE — 84443 ASSAY THYROID STIM HORMONE: CPT | Performed by: INTERNAL MEDICINE

## 2024-11-27 PROCEDURE — 80048 BASIC METABOLIC PNL TOTAL CA: CPT | Performed by: INTERNAL MEDICINE

## 2024-11-27 RX ORDER — TRIAMCINOLONE ACETONIDE 1 MG/G
CREAM TOPICAL PRN
COMMUNITY
Start: 2024-06-10

## 2024-11-27 RX ORDER — FINASTERIDE 5 MG/1
1 TABLET, FILM COATED ORAL
COMMUNITY
Start: 2024-10-21

## 2024-11-27 RX ORDER — DOXAZOSIN 8 MG/1
1 TABLET ORAL
COMMUNITY
Start: 2024-11-17

## 2024-11-27 NOTE — LETTER
11/27/2024    Kieran Griffin MD, MD  [address]  [address] [place] [address]    RE: Kevin Leblanc       Dear Colleague,     I had the pleasure of seeing Kevin Leblanc in the [facility].    HEART CARE OUT-PATIENT CONSULTATON NOTE      [facility]  [phone]      Assessment/Recommendations   Assessment: 84 year old male with dyspnea     Plan:  Dyspnea, CHF   Symptoms and findings most suggestive of CHF, though also consider ischemia given abnormal stress in 2019.  Sinus bradycardia in the 40s is unlikely to be the cause with recent holter showing no symptoms and no indications for pacemaker       -As below      -Check TSH    HFpEF  Symptoms and clinical findings suggestive of CHF/volume overload.  Doing better with Lasix added.  Last echocardiogram with preserved EF       -Continue Lasix 20mg, weight 149      -Check BMP and Mag, if creatinine normal can add Lisinopril 5mg       -As below for CAD    CAD  S/p CABG 2015 with stress in 2019 abnormal suggesting issues with grafts to dLAD and ramus.  Patient preferred medical Rx at the time.  Reviewed this finding, the current symptoms and that ischemia may be playing a role.      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  Unlikely this is the source of his dyspnea, no current indication for pacemaker       -Stop Lopressor      -As above for CAD and CHF      -Consider repeat Zio off beta blockers     HTN  Well controlled      -Stop Lopressor for bradycardia       -Continue Cardura 8mg       -If creatinine normal can add Lisinopril for  "HFpEF     Hyperlipidemia   LDL = 54      -Continue Lipitor 40mg     The longitudinal plan of care for the diagnosis(es)/condition(s) as documented were addressed during this visit. Due to the added complexity in care, I will continue to support Kevin in the subsequent management and with ongoing continuity of care.          History of Present Illness/Subjective    Indication for Consult:  I was asked to see Kevin Leblanc by Jaguar Vivas for dyspnea, bradycardia, CHF, CAD       HPI: Kevin Leblanc is a 84 year old male with a history of HTN, hyperlipidemia, CAD s/p remote CABG who presents for evaluation of dyspnea.  He was seen in the ER 11/20/2024 for dyspnea, hs troponin negative, ECG with mild sinus bradycardia, BNP 1756, CXR with edema.  He was started on lasix and advised to see cardiology.  Last saw Dr Becerra 2019 after abnormal stress test, medical Rx was chosen.    He reports was seeing Dr Kebede and was short of breath and some edema that was worse so was advised to go to the ER.  Since starting Lasix he feels much better, less short of breath, no chest pain, palpitations, pre-syncope, or syncope.    I reviewed notes from ER, PCP prior to this visit.         Physical Examination  Past Cardiac History   Vitals: /52 (BP Location: Right arm, Patient Position: Sitting, Cuff Size: Adult Regular)   Pulse 55   Resp 16   Ht 1.613 m (5' 3.5\")   Wt 67.9 kg (149 lb 9.6 oz)   SpO2 91%   BMI 26.08 kg/m    BMI= Body mass index is 26.08 kg/m .  Wt Readings from Last 3 Encounters:   11/27/24 67.9 kg (149 lb 9.6 oz)   11/20/24 71.2 kg (156 lb 15.5 oz)   08/01/19 72.1 kg (159 lb)       General Appearance:   no distress, normal body habitus   ENT/Mouth: membranes moist, no oral lesions or bleeding gums.      EYES:  no scleral icterus, normal conjunctivae   Neck: no carotid bruits or thyromegaly   Chest/Lungs:   lungs are clear to auscultation, no rales or wheezing,  sternal scar, equal chest wall expansion  "   Cardiovascular:   Regular. Normal first and second heart sounds with no murmurs, rubs, or gallops; the carotid, radial and posterior tibial pulses are intact, Jugular venous pressure normal, no edema bilaterally    Abdomen:  no organomegaly, masses, bruits, or tenderness; bowel sounds are present   Extremities: no cyanosis or clubbing   Skin: no xanthelasma, warm.    Neurologic: normal  bilateral, no tremors           CAD: s/p CABG with LIMA to LAD, SVGs to dLAD, ramus, and rPDA 5/2015  HFpEF    Holter: 9/27/2024  PREDOMINANT RHYTHM: Normal sinus rhythm, with normal heart rate response.  CARDIAC CONDUCTION: WA interval, QRS duration, QT interval all appeared to be normal.  No significant bradycardia/pauses.  ATRIAL ARRHYTHMIA: Modestly increased atrial ectopy (2% burden).  No nonsustained ectopic atrial tachycardia. No sustained ectopic atrial tachycardia,  atrial fibrillation, or other SVT.  VENTRICULAR ARRHYTHMIA: Modest increase in ventricular ectopy (burden 5%).  No complex ventricular ectopy or sustained ventricular tachyarrhythmia.  SYMPTOMATIC RECORDINGS: Patient did not report any cardiac symptoms.     IMPRESSION: Borderline 24-hour Holter monitor recording by virtue of some blunting of the patient's heart rate response.  There was however no  evidence of symptoms referable to heart rate.  No symptomatic recordings.     Most Recent Echocardiogram: 7/8/2019    Left ventricle ejection fraction is normal. The estimated left ventricular ejection fraction is 65%.    Normal left ventricular size.    Left atrial volume is mildly increased.    Normal right ventricular size and systolic function.    No hemodynamically significant valvular heart abnormalities.    No previous study for comparison.    Most Recent Stress Test: 7/26/2019    The pharmacologic nuclear stress test is abnormal.    There is a medium sized area of ischemia in the anterior and anterolateral segment(s) of the left ventricle.    The left  ventricular ejection fraction is 67%.    Severe resting hypertension noted at baseline.    The patient is at an intermediate risk of future cardiac ischemic events.    There is no prior study available.    Most Recent Angiogram: 2015    1.  Severe multivessel CAD.        LAD: Severe proximal stenosis, moderate ISR in mid LAD,         distal LAD has 80% stenosis.        Ramus Intermedius: Intermediate proximal lesion,         possible recent ruptured plaque, abnormal IFR 0.85.        RCA: 50% ostial stenosis with multiple moderate         proximal, mid, distal lesions.  Abnormal FFR 0.78.        2.  LVEDP 10mmHg.     ECG (reviewed by myself): 2024 NSR44 bpm, 1st degree AVB ( bpm), incomplete RBBB           Medical History  Family History Social History   CAD  HTN  Hyperlipidemia   No history of premature CAD, sudden death, or cardiomyopathy      Social History     Socioeconomic History     Marital status:      Spouse name: Not on file     Number of children: Not on file     Years of education: Not on file     Highest education level: Not on file   Occupational History     Not on file   Tobacco Use     Smoking status: Former     Current packs/day: 0.00     Average packs/day: 1 pack/day for 26.0 years (26.0 ttl pk-yrs)     Types: Cigarettes     Start date: 1945     Quit date: 1971     Years since quittin.9     Smokeless tobacco: Never   Substance and Sexual Activity     Alcohol use: No     Drug use: No     Sexual activity: Not on file   Other Topics Concern     Not on file   Social History Narrative     Not on file     Social Drivers of Health     Financial Resource Strain: Not on file   Food Insecurity: Not on file   Transportation Needs: Not on file   Physical Activity: Not on file   Stress: Not on file   Social Connections: Not on file   Interpersonal Safety: Not on file   Housing Stability: Not on file           Medications  Allergies   Current Outpatient Medications    Medication Sig Dispense Refill     aspirin 81 MG EC tablet [ASPIRIN 81 MG EC TABLET] Take 1 tablet (81 mg total) by mouth daily.  0     atorvastatin (LIPITOR) 40 MG tablet [ATORVASTATIN (LIPITOR) 40 MG TABLET] Take 40 mg by mouth at bedtime.   4     doxazosin (CARDURA) 8 MG tablet Take 1 tablet by mouth daily at 2 pm.       finasteride (PROSCAR) 5 MG tablet Take 1 tablet by mouth daily at 2 pm.       furosemide (LASIX) 20 MG tablet Take 1 tablet (20 mg) by mouth daily. 30 tablet 0     ibuprofen (ADVIL,MOTRIN) 800 MG tablet [IBUPROFEN (ADVIL,MOTRIN) 800 MG TABLET] Take 800 mg by mouth every 8 (eight) hours as needed for pain.        metoprolol tartrate (LOPRESSOR) 25 MG tablet [METOPROLOL TARTRATE (LOPRESSOR) 25 MG TABLET] Take 1 tablet (25 mg total) by mouth 2 (two) times a day. (Patient taking differently: Take 12.5 mg by mouth daily.) 60 tablet 0     nitroglycerin (NITROSTAT) 0.4 MG SL tablet [NITROGLYCERIN (NITROSTAT) 0.4 MG SL TABLET] Place 0.4 mg under the tongue every 5 (five) minutes as needed.   1     OMEGA-3 FATTY ACIDS-VITAMIN E PO Take 1,000 capsules by mouth daily.       tamsulosin (FLOMAX) 0.4 mg Cp24 [TAMSULOSIN (FLOMAX) 0.4 MG CP24] Take 0.4 mg by mouth daily after supper.   1     triamcinolone (KENALOG) 0.1 % external cream Apply topically as needed for irritation.         Allergies   Allergen Reactions     Nsaids Anaphylaxis and Unknown     Oxycodone Unknown     Plavix [Clopidogrel] Unknown     Vioxx [Rofecoxib] Unknown     Morphine Anxiety and Other (See Comments)     Reaction(s): Anxiety and Hallucinations.          Lab Results    Chemistry/lipid CBC Cardiac Enzymes/BNP/TSH/INR   Recent Labs   Lab Test 02/13/24  1108   CHOL 116   HDL 36*   LDL 54   TRIG 130     Recent Labs   Lab Test 02/13/24  1108 02/09/23  1106 03/15/22  1451   LDL 54 54 53     Recent Labs   Lab Test 11/20/24  1849      POTASSIUM 4.5   CHLORIDE 106   CO2 26   *   BUN 15.4   CR 1.07   GFRESTIMATED 68   KEEGAN 8.5*  "    Recent Labs   Lab Test 11/20/24  1849 08/13/24  1619 02/13/24  1108   CR 1.07 1.18* 1.11     No results for input(s): \"A1C\" in the last 21701 hours.       Recent Labs   Lab Test 11/20/24 1849   WBC 8.0   HGB 11.6*   HCT 36.4*   MCV 90        Recent Labs   Lab Test 11/20/24 1849 07/05/19  1130 05/15/18  0735   HGB 11.6* 15.0 13.3*    No results for input(s): \"TROPONINI\" in the last 10408 hours.  Recent Labs   Lab Test 11/20/24 1849 07/05/19  1130   BNP  --  101*   NTBNPI 1,756  --      No results for input(s): \"TSH\" in the last 88015 hours.  No results for input(s): \"INR\" in the last 35424 hours.     Shantelle Garcia MD  Noninvasive Cardiologist   [facility]                                        Thank you for allowing me to participate in the care of your patient.      Sincerely,     Shantelle Garcia MD     [facility]  cc:   Jaguar Bradshaw MD  [address] [place] [id number]      "

## 2024-12-02 ENCOUNTER — HOSPITAL ENCOUNTER (OUTPATIENT)
Dept: NUCLEAR MEDICINE | Facility: CLINIC | Age: 84
Discharge: HOME | End: 2024-12-02
Attending: INTERNAL MEDICINE
Payer: COMMERCIAL

## 2024-12-02 ENCOUNTER — HOSPITAL ENCOUNTER (OUTPATIENT)
Dept: CARDIOLOGY | Facility: CLINIC | Age: 84
Discharge: HOME | End: 2024-12-02
Attending: INTERNAL MEDICINE
Payer: COMMERCIAL

## 2024-12-02 DIAGNOSIS — R06.09 DOE (DYSPNEA ON EXERTION): ICD-10-CM

## 2024-12-02 DIAGNOSIS — I25.10 CORONARY ARTERY DISEASE INVOLVING NATIVE CORONARY ARTERY OF NATIVE HEART WITHOUT ANGINA PECTORIS: ICD-10-CM

## 2024-12-02 DIAGNOSIS — R94.39 ABNORMAL STRESS TEST: Primary | ICD-10-CM

## 2024-12-02 LAB
CV STRESS CURRENT BP HE: NORMAL
CV STRESS CURRENT HR HE: 61
CV STRESS CURRENT HR HE: 62
CV STRESS CURRENT HR HE: 64
CV STRESS CURRENT HR HE: 70
CV STRESS CURRENT HR HE: 71
CV STRESS CURRENT HR HE: 72
CV STRESS CURRENT HR HE: 72
CV STRESS CURRENT HR HE: 74
CV STRESS CURRENT HR HE: 75
CV STRESS CURRENT HR HE: 76
CV STRESS CURRENT HR HE: 77
CV STRESS CURRENT HR HE: 77
CV STRESS CURRENT HR HE: 80
CV STRESS CURRENT HR HE: 81
CV STRESS CURRENT HR HE: 82
CV STRESS CURRENT HR HE: 83
CV STRESS DEVIATION TIME HE: NORMAL
CV STRESS ECHO PERCENT HR HE: NORMAL
CV STRESS EXERCISE STAGE HE: NORMAL
CV STRESS FINAL RESTING BP HE: NORMAL
CV STRESS FINAL RESTING HR HE: 76
CV STRESS MAX HR HE: 83
CV STRESS MAX TREADMILL GRADE HE: 0
CV STRESS MAX TREADMILL SPEED HE: 0
CV STRESS PEAK DIA BP HE: NORMAL
CV STRESS PEAK SYS BP HE: NORMAL
CV STRESS PHASE HE: NORMAL
CV STRESS PROTOCOL HE: NORMAL
CV STRESS RESTING PT POSITION HE: NORMAL
CV STRESS ST DEVIATION AMOUNT HE: NORMAL
CV STRESS ST DEVIATION ELEVATION HE: NORMAL
CV STRESS ST EVELATION AMOUNT HE: NORMAL
CV STRESS TEST TYPE HE: NORMAL
CV STRESS TOTAL STAGE TIME MIN 1 HE: NORMAL
NUC STRESS EJECTION FRACTION: 65 %
RATE PRESSURE PRODUCT: 9545
STRESS ECHO BASELINE DIASTOLIC HE: 71
STRESS ECHO BASELINE HR: 61
STRESS ECHO BASELINE SYSTOLIC BP: 175
STRESS ECHO CALCULATED PERCENT HR: 62 %
STRESS ECHO LAST STRESS DIASTOLIC BP: 63
STRESS ECHO LAST STRESS HR: 77
STRESS ECHO LAST STRESS SYSTOLIC BP: 115
STRESS ECHO TARGET HR: 134

## 2024-12-02 PROCEDURE — 93016 CV STRESS TEST SUPVJ ONLY: CPT | Performed by: INTERNAL MEDICINE

## 2024-12-02 PROCEDURE — 250N000011 HC RX IP 250 OP 636: Performed by: INTERNAL MEDICINE

## 2024-12-02 PROCEDURE — 343N000001 HC RX 343 MED OP 636: Performed by: INTERNAL MEDICINE

## 2024-12-02 PROCEDURE — 93017 CV STRESS TEST TRACING ONLY: CPT

## 2024-12-02 PROCEDURE — 78452 HT MUSCLE IMAGE SPECT MULT: CPT

## 2024-12-02 PROCEDURE — 93018 CV STRESS TEST I&R ONLY: CPT | Performed by: INTERNAL MEDICINE

## 2024-12-02 PROCEDURE — A9500 TC99M SESTAMIBI: HCPCS | Performed by: INTERNAL MEDICINE

## 2024-12-02 PROCEDURE — 78452 HT MUSCLE IMAGE SPECT MULT: CPT | Mod: 26 | Performed by: INTERNAL MEDICINE

## 2024-12-02 RX ORDER — REGADENOSON 0.08 MG/ML
0.4 INJECTION, SOLUTION INTRAVENOUS ONCE
Status: COMPLETED | OUTPATIENT
Start: 2024-12-02 | End: 2024-12-02

## 2024-12-02 RX ORDER — AMINOPHYLLINE 25 MG/ML
50-100 INJECTION, SOLUTION INTRAVENOUS
Status: DISCONTINUED | OUTPATIENT
Start: 2024-12-02 | End: 2024-12-03 | Stop reason: HOSPADM

## 2024-12-02 RX ADMIN — Medication 28.6 MILLICURIE: at 08:25

## 2024-12-02 RX ADMIN — Medication 8.4 MILLICURIE: at 07:28

## 2024-12-02 RX ADMIN — REGADENOSON 0.4 MG: 0.08 INJECTION, SOLUTION INTRAVENOUS at 08:15

## 2024-12-03 DIAGNOSIS — I10 PRIMARY HYPERTENSION: ICD-10-CM

## 2024-12-03 DIAGNOSIS — I25.10 CORONARY ARTERY DISEASE INVOLVING NATIVE CORONARY ARTERY OF NATIVE HEART WITHOUT ANGINA PECTORIS: ICD-10-CM

## 2024-12-03 DIAGNOSIS — R94.39 ABNORMAL STRESS TEST: Primary | ICD-10-CM

## 2024-12-04 ENCOUNTER — PREP FOR PROCEDURE (OUTPATIENT)
Dept: CARDIOLOGY | Facility: CLINIC | Age: 84
End: 2024-12-04
Payer: COMMERCIAL

## 2024-12-04 ENCOUNTER — TELEPHONE (OUTPATIENT)
Dept: CARDIOLOGY | Facility: CLINIC | Age: 84
End: 2024-12-04
Payer: COMMERCIAL

## 2024-12-04 DIAGNOSIS — R94.39 ABNORMAL STRESS TEST: Primary | ICD-10-CM

## 2024-12-04 DIAGNOSIS — I25.10 CAD (CORONARY ARTERY DISEASE): ICD-10-CM

## 2024-12-04 DIAGNOSIS — R06.09 DYSPNEA ON MINIMAL EXERTION: ICD-10-CM

## 2024-12-04 DIAGNOSIS — I10 PRIMARY HYPERTENSION: ICD-10-CM

## 2024-12-04 RX ORDER — SODIUM CHLORIDE 9 MG/ML
INJECTION, SOLUTION INTRAVENOUS CONTINUOUS
OUTPATIENT
Start: 2024-12-06

## 2024-12-04 RX ORDER — ASPIRIN 81 MG/1
243 TABLET, CHEWABLE ORAL ONCE
OUTPATIENT
Start: 2024-12-04

## 2024-12-04 RX ORDER — LIDOCAINE 40 MG/G
CREAM TOPICAL
OUTPATIENT
Start: 2024-12-04

## 2024-12-04 RX ORDER — ASPIRIN 325 MG
325 TABLET ORAL ONCE
OUTPATIENT
Start: 2024-12-04 | End: 2024-12-04

## 2024-12-04 RX ORDER — FENTANYL CITRATE 50 UG/ML
25 INJECTION, SOLUTION INTRAMUSCULAR; INTRAVENOUS
OUTPATIENT
Start: 2024-12-06

## 2024-12-04 NOTE — TELEPHONE ENCOUNTER
Phone call to Kimberly around 1000 12/4/24 that if pt would prefer [facility] for care, referral would need to come from PCP or from previous Cardiologist with whom he followed when at [facility]. Kimberly stated she would have continued discussions with her dad regarding his upcoming procedure but stated she'd like to keep it scheduled for now. Informed Kimberly would call before end of clinic day for update. Understanding verbalized. LMS

## 2024-12-04 NOTE — TELEPHONE ENCOUNTER
Kevin MALDONADO Deana  [address] [facility] [address]  [phone] (home)     Procedure cardiologist:  Dr. Bull or Dr. Levy  PCP:  Kieran Griffin  H&P completed by:  11/27/24 BJRAJESH  Admit date: 12/6/24  Arrival time:  0700  Anticoagulation: None  CPAP: No  Previous PCI: Yes - PCI x2 6/15/2001  Bypass Grafts: Yes - CABGx4 5/15/2015  Renal Issues: No  Diabetic?: Prediabetic  Device?: No  Type:  N/A  Ambulation status: Independent  Needs : Yes, German - requested 12/4    Patient Education/  Angiogram Teaching    Reason for Visit:  Telephone call to discuss pre-procedure education in preparation for: CA poss PCI, grafts, LHC    Procedure Prep:  Primary Cardiologist note dated: 11/27/24 BJW  EKG results obtained, dated: to be done on admission  Hemogram results obtained: to be done on admission  Basic Metabolic Panel results obtained: to be done on admission  Lipid Profile results obtained: 2/13/24, to be done on admission  Pertinent cardiac test results: 12/2/24 Abnormal stress    Pre-procedure instructions  Patient instructed to be NPO per anesthesia guidelines.  Patient instructed to shower the evening before or the morning of the procedure.  Patient instructed to arrange for transportation home following procedure from a responsible family member of friend (Lisbeth frederick). No driving for at least 24 hours.  Patient instructed to have a responsible adult with them for 24 hours post-procedure (Kimberly frederick).  Post-procedure follow up process.  Conscious sedation discussed.    Pre-procedure medication instructions  Patient instructed on antiplatelet medication.  Continue medications as scheduled, with a small amount of water on the day of the procedure unless indicated.  Patient instructed to take 4-81 mg of Aspirin AM of procedure: yes  Other medication: Takes all other medications in the afternoon.       Patient states understanding of procedure and agrees to  proceed.    *PATIENTS RECORDS AVAILABLE IN [facility] UNLESS OTHERWISE INDICATED*      Patient Active Problem List   Diagnosis    Carotid arterial disease (H)    Coronary artery disease involving native coronary artery without angina pectoris    Dyspnea on exertion       Current Outpatient Medications   Medication Sig Dispense Refill    aspirin 81 MG EC tablet [ASPIRIN 81 MG EC TABLET] Take 1 tablet (81 mg total) by mouth daily.  0    atorvastatin (LIPITOR) 40 MG tablet [ATORVASTATIN (LIPITOR) 40 MG TABLET] Take 40 mg by mouth at bedtime.   4    doxazosin (CARDURA) 8 MG tablet Take 1 tablet by mouth daily at 2 pm.      finasteride (PROSCAR) 5 MG tablet Take 1 tablet by mouth daily at 2 pm.      furosemide (LASIX) 20 MG tablet Take 1 tablet (20 mg) by mouth daily. 30 tablet 0    ibuprofen (ADVIL,MOTRIN) 800 MG tablet [IBUPROFEN (ADVIL,MOTRIN) 800 MG TABLET] Take 800 mg by mouth every 8 (eight) hours as needed for pain.       metoprolol tartrate (LOPRESSOR) 25 MG tablet [METOPROLOL TARTRATE (LOPRESSOR) 25 MG TABLET] Take 1 tablet (25 mg total) by mouth 2 (two) times a day. (Patient taking differently: Take 12.5 mg by mouth daily.) 60 tablet 0    nitroglycerin (NITROSTAT) 0.4 MG SL tablet [NITROGLYCERIN (NITROSTAT) 0.4 MG SL TABLET] Place 0.4 mg under the tongue every 5 (five) minutes as needed.   1    OMEGA-3 FATTY ACIDS-VITAMIN E PO Take 1,000 capsules by mouth daily.      tamsulosin (FLOMAX) 0.4 mg Cp24 [TAMSULOSIN (FLOMAX) 0.4 MG CP24] Take 0.4 mg by mouth daily after supper.   1    triamcinolone (KENALOG) 0.1 % external cream Apply topically as needed for irritation.         Allergies   Allergen Reactions    Nsaids Anaphylaxis and Unknown    Oxycodone Unknown    Plavix [Clopidogrel] Unknown    Vioxx [Rofecoxib] Unknown    Morphine Anxiety and Other (See Comments)     Reaction(s): Anxiety and Hallucinations.         Susie Bernal, RN, BSN\\

## 2024-12-04 NOTE — TELEPHONE ENCOUNTER
"----- Message from Katy GOMES sent at 12/4/2024  9:27 AM CST -----  Regarding: RE: BJW pt  Case type: CA POSS PCI, LHC     Procedure Physician(s): DEMARCO/PJ    Procedure Date and Patient Arrival Time: Friday 12/6, with arrival time of 0700    H&P: Completed on 11/27 BJ    Pre-Procedure Lab Appt: ON ADMISSION - Please place lab orders if you haven't already!    Alerts/Important Info: s/p CABGx4 2015; s/p PCI x2 10/2001    Interpretor Requested: YES, REQUESTED 12/4    Kimberly is wondering if there is any chance the patient could have the procedure at [facility], I let her know that I could not schedule there and I dont know their scheduling process/how quickly they could get in for a procedure. She wanted to proceed with scheduling the procedure with us but was requesting that we send a referral over to [facility] so she can see if they could go there instead.     Kimberly also reports that patient is concerned because he remembers being very uncomfortable and \"felt everything\" during his last angiogram. She states she is available for a phone call for the teach after 2:30 pm today.     Thanks,   Shaina  ----- Message -----  From: Susie Bernal RN  Sent: 12/3/2024  10:34 AM CST  To: [facility] Procedure  Pool - L[facility]  Subject: BJW pt                                           Case Type: CA grafts, poss PCI, LHC  Ordering Provider: [facility]  H&P: 11/27 [facility]  Alerts: Needs ; s/p CABGx4 2015; s/p PCI x2 10/2001  Additional Info/Urgency: Daughter to schedule, speaks English  Orders for Pre-Procedure Labs? To be done on admission     Please discontinue previous case request placed 12/2/24 and use new one placed today 12/3/24.  "

## 2024-12-06 ENCOUNTER — HOSPITAL ENCOUNTER (OUTPATIENT)
Facility: HOSPITAL | Age: 84
Discharge: HOME | End: 2024-12-06
Attending: STUDENT IN AN ORGANIZED HEALTH CARE EDUCATION/TRAINING PROGRAM | Admitting: STUDENT IN AN ORGANIZED HEALTH CARE EDUCATION/TRAINING PROGRAM
Payer: COMMERCIAL

## 2024-12-06 VITALS
HEART RATE: 59 BPM | TEMPERATURE: 98.3 F | WEIGHT: 150 LBS | SYSTOLIC BLOOD PRESSURE: 145 MMHG | BODY MASS INDEX: 26.15 KG/M2 | RESPIRATION RATE: 12 BRPM | DIASTOLIC BLOOD PRESSURE: 67 MMHG | OXYGEN SATURATION: 95 %

## 2024-12-06 DIAGNOSIS — R06.09 DYSPNEA ON MINIMAL EXERTION: ICD-10-CM

## 2024-12-06 DIAGNOSIS — I10 PRIMARY HYPERTENSION: ICD-10-CM

## 2024-12-06 DIAGNOSIS — R94.39 ABNORMAL STRESS TEST: ICD-10-CM

## 2024-12-06 DIAGNOSIS — I25.10 CORONARY ARTERY DISEASE INVOLVING NATIVE CORONARY ARTERY OF NATIVE HEART WITHOUT ANGINA PECTORIS: ICD-10-CM

## 2024-12-06 DIAGNOSIS — Z98.890 S/P CORONARY ANGIOGRAM: Primary | ICD-10-CM

## 2024-12-06 DIAGNOSIS — I25.10 CAD (CORONARY ARTERY DISEASE): ICD-10-CM

## 2024-12-06 LAB
ABO/RH(D): NORMAL
ANION GAP SERPL CALCULATED.3IONS-SCNC: 10 MMOL/L (ref 7–15)
ANTIBODY SCREEN: NEGATIVE
ATRIAL RATE - MUSE: 65 BPM
BUN SERPL-MCNC: 22.2 MG/DL (ref 8–23)
CALCIUM SERPL-MCNC: 9 MG/DL (ref 8.8–10.4)
CHLORIDE SERPL-SCNC: 103 MMOL/L (ref 98–107)
CHOLEST SERPL-MCNC: 149 MG/DL
CREAT SERPL-MCNC: 1.23 MG/DL (ref 0.67–1.17)
DIASTOLIC BLOOD PRESSURE - MUSE: NORMAL MMHG
EGFRCR SERPLBLD CKD-EPI 2021: 57 ML/MIN/1.73M2
ERYTHROCYTE [DISTWIDTH] IN BLOOD BY AUTOMATED COUNT: 12.4 % (ref 10–15)
FASTING STATUS PATIENT QL REPORTED: YES
FASTING STATUS PATIENT QL REPORTED: YES
GLUCOSE SERPL-MCNC: 134 MG/DL (ref 70–99)
HCO3 SERPL-SCNC: 27 MMOL/L (ref 22–29)
HCT VFR BLD AUTO: 42.4 % (ref 40–53)
HDLC SERPL-MCNC: 41 MG/DL
HGB BLD-MCNC: 14 G/DL (ref 13.3–17.7)
INTERPRETATION ECG - MUSE: NORMAL
LDLC SERPL CALC-MCNC: 86 MG/DL
MCH RBC QN AUTO: 29.2 PG (ref 26.5–33)
MCHC RBC AUTO-ENTMCNC: 33 G/DL (ref 31.5–36.5)
MCV RBC AUTO: 89 FL (ref 78–100)
NONHDLC SERPL-MCNC: 108 MG/DL
P AXIS - MUSE: 61 DEGREES
PLATELET # BLD AUTO: 177 10E3/UL (ref 150–450)
POTASSIUM SERPL-SCNC: 4.5 MMOL/L (ref 3.4–5.3)
PR INTERVAL - MUSE: 238 MS
QRS DURATION - MUSE: 92 MS
QT - MUSE: 430 MS
QTC - MUSE: 447 MS
R AXIS - MUSE: 89 DEGREES
RBC # BLD AUTO: 4.79 10E6/UL (ref 4.4–5.9)
SODIUM SERPL-SCNC: 140 MMOL/L (ref 135–145)
SPECIMEN EXPIRATION DATE: NORMAL
SYSTOLIC BLOOD PRESSURE - MUSE: NORMAL MMHG
T AXIS - MUSE: 42 DEGREES
TRIGL SERPL-MCNC: 110 MG/DL
VENTRICULAR RATE- MUSE: 65 BPM
WBC # BLD AUTO: 11.1 10E3/UL (ref 4–11)

## 2024-12-06 PROCEDURE — 258N000003 HC RX IP 258 OP 636: Performed by: INTERNAL MEDICINE

## 2024-12-06 PROCEDURE — 99152 MOD SED SAME PHYS/QHP 5/>YRS: CPT | Performed by: STUDENT IN AN ORGANIZED HEALTH CARE EDUCATION/TRAINING PROGRAM

## 2024-12-06 PROCEDURE — 99153 MOD SED SAME PHYS/QHP EA: CPT | Performed by: STUDENT IN AN ORGANIZED HEALTH CARE EDUCATION/TRAINING PROGRAM

## 2024-12-06 PROCEDURE — 93459 L HRT ART/GRFT ANGIO: CPT | Mod: 26 | Performed by: STUDENT IN AN ORGANIZED HEALTH CARE EDUCATION/TRAINING PROGRAM

## 2024-12-06 PROCEDURE — 93459 L HRT ART/GRFT ANGIO: CPT | Performed by: STUDENT IN AN ORGANIZED HEALTH CARE EDUCATION/TRAINING PROGRAM

## 2024-12-06 PROCEDURE — C1769 GUIDE WIRE: HCPCS | Performed by: STUDENT IN AN ORGANIZED HEALTH CARE EDUCATION/TRAINING PROGRAM

## 2024-12-06 PROCEDURE — 85018 HEMOGLOBIN: CPT | Performed by: INTERNAL MEDICINE

## 2024-12-06 PROCEDURE — 272N000001 HC OR GENERAL SUPPLY STERILE: Performed by: STUDENT IN AN ORGANIZED HEALTH CARE EDUCATION/TRAINING PROGRAM

## 2024-12-06 PROCEDURE — 999N000054 HC STATISTIC EKG NON-CHARGEABLE

## 2024-12-06 PROCEDURE — 80048 BASIC METABOLIC PNL TOTAL CA: CPT | Performed by: INTERNAL MEDICINE

## 2024-12-06 PROCEDURE — 93010 ELECTROCARDIOGRAM REPORT: CPT | Performed by: INTERNAL MEDICINE

## 2024-12-06 PROCEDURE — 255N000002 HC RX 255 OP 636: Performed by: STUDENT IN AN ORGANIZED HEALTH CARE EDUCATION/TRAINING PROGRAM

## 2024-12-06 PROCEDURE — 85048 AUTOMATED LEUKOCYTE COUNT: CPT | Performed by: INTERNAL MEDICINE

## 2024-12-06 PROCEDURE — 93005 ELECTROCARDIOGRAM TRACING: CPT

## 2024-12-06 PROCEDURE — 250N000009 HC RX 250: Performed by: STUDENT IN AN ORGANIZED HEALTH CARE EDUCATION/TRAINING PROGRAM

## 2024-12-06 PROCEDURE — 250N000011 HC RX IP 250 OP 636: Performed by: STUDENT IN AN ORGANIZED HEALTH CARE EDUCATION/TRAINING PROGRAM

## 2024-12-06 PROCEDURE — C1894 INTRO/SHEATH, NON-LASER: HCPCS | Performed by: STUDENT IN AN ORGANIZED HEALTH CARE EDUCATION/TRAINING PROGRAM

## 2024-12-06 PROCEDURE — 86900 BLOOD TYPING SEROLOGIC ABO: CPT | Performed by: INTERNAL MEDICINE

## 2024-12-06 PROCEDURE — 36415 COLL VENOUS BLD VENIPUNCTURE: CPT | Performed by: INTERNAL MEDICINE

## 2024-12-06 PROCEDURE — 80061 LIPID PANEL: CPT | Performed by: INTERNAL MEDICINE

## 2024-12-06 RX ORDER — LIDOCAINE 40 MG/G
CREAM TOPICAL
Status: DISCONTINUED | OUTPATIENT
Start: 2024-12-06 | End: 2024-12-06 | Stop reason: HOSPADM

## 2024-12-06 RX ORDER — FENTANYL CITRATE 50 UG/ML
25 INJECTION, SOLUTION INTRAMUSCULAR; INTRAVENOUS
Status: DISCONTINUED | OUTPATIENT
Start: 2024-12-06 | End: 2024-12-06 | Stop reason: HOSPADM

## 2024-12-06 RX ORDER — ACETAMINOPHEN 325 MG/1
650 TABLET ORAL EVERY 4 HOURS PRN
Status: DISCONTINUED | OUTPATIENT
Start: 2024-12-06 | End: 2024-12-06 | Stop reason: HOSPADM

## 2024-12-06 RX ORDER — FLUMAZENIL 0.1 MG/ML
0.2 INJECTION, SOLUTION INTRAVENOUS
Status: DISCONTINUED | OUTPATIENT
Start: 2024-12-06 | End: 2024-12-06 | Stop reason: HOSPADM

## 2024-12-06 RX ORDER — NALOXONE HYDROCHLORIDE 0.4 MG/ML
0.4 INJECTION, SOLUTION INTRAMUSCULAR; INTRAVENOUS; SUBCUTANEOUS
Status: DISCONTINUED | OUTPATIENT
Start: 2024-12-06 | End: 2024-12-06 | Stop reason: HOSPADM

## 2024-12-06 RX ORDER — NALOXONE HYDROCHLORIDE 0.4 MG/ML
0.2 INJECTION, SOLUTION INTRAMUSCULAR; INTRAVENOUS; SUBCUTANEOUS
Status: DISCONTINUED | OUTPATIENT
Start: 2024-12-06 | End: 2024-12-06 | Stop reason: HOSPADM

## 2024-12-06 RX ORDER — FENTANYL CITRATE 50 UG/ML
INJECTION, SOLUTION INTRAMUSCULAR; INTRAVENOUS
Status: DISCONTINUED | OUTPATIENT
Start: 2024-12-06 | End: 2024-12-06 | Stop reason: HOSPADM

## 2024-12-06 RX ORDER — IODIXANOL 320 MG/ML
INJECTION, SOLUTION INTRAVASCULAR
Status: DISCONTINUED | OUTPATIENT
Start: 2024-12-06 | End: 2024-12-06 | Stop reason: HOSPADM

## 2024-12-06 RX ORDER — ATROPINE SULFATE 0.1 MG/ML
0.5 INJECTION INTRAVENOUS
Status: DISCONTINUED | OUTPATIENT
Start: 2024-12-06 | End: 2024-12-06 | Stop reason: HOSPADM

## 2024-12-06 RX ORDER — SODIUM CHLORIDE 9 MG/ML
INJECTION, SOLUTION INTRAVENOUS CONTINUOUS
Status: DISCONTINUED | OUTPATIENT
Start: 2024-12-06 | End: 2024-12-06 | Stop reason: HOSPADM

## 2024-12-06 RX ORDER — ASPIRIN 325 MG
325 TABLET ORAL ONCE
Status: DISCONTINUED | OUTPATIENT
Start: 2024-12-06 | End: 2024-12-06 | Stop reason: HOSPADM

## 2024-12-06 RX ORDER — METOPROLOL TARTRATE 1 MG/ML
INJECTION, SOLUTION INTRAVENOUS
Status: DISCONTINUED | OUTPATIENT
Start: 2024-12-06 | End: 2024-12-06 | Stop reason: HOSPADM

## 2024-12-06 RX ORDER — OXYCODONE HYDROCHLORIDE 5 MG/1
10 TABLET ORAL EVERY 4 HOURS PRN
Status: DISCONTINUED | OUTPATIENT
Start: 2024-12-06 | End: 2024-12-06 | Stop reason: HOSPADM

## 2024-12-06 RX ORDER — OXYCODONE HYDROCHLORIDE 5 MG/1
5 TABLET ORAL EVERY 4 HOURS PRN
Status: DISCONTINUED | OUTPATIENT
Start: 2024-12-06 | End: 2024-12-06 | Stop reason: HOSPADM

## 2024-12-06 RX ORDER — ASPIRIN 81 MG/1
243 TABLET, CHEWABLE ORAL ONCE
Status: DISCONTINUED | OUTPATIENT
Start: 2024-12-06 | End: 2024-12-06 | Stop reason: HOSPADM

## 2024-12-06 RX ADMIN — SODIUM CHLORIDE: 9 INJECTION, SOLUTION INTRAVENOUS at 08:35

## 2024-12-06 ASSESSMENT — ACTIVITIES OF DAILY LIVING (ADL)
ADLS_ACUITY_SCORE: 41

## 2024-12-06 NOTE — INTERVAL H&P NOTE
"I have reviewed the surgical (or preoperative) H&P that is linked to this encounter, and examined the patient. There are no significant changes    Clinical Conditions Present on Arrival:  Clinically Significant Risk Factors Present on Admission                  # Drug Induced Platelet Defect: home medication list includes an antiplatelet medication      # Overweight: Estimated body mass index is 26.15 kg/m  as calculated from the following:    Height as of 11/27/24: 1.613 m (5' 3.5\").    Weight as of this encounter: 68 kg (150 lb).       "

## 2024-12-06 NOTE — PRE-PROCEDURE
GENERAL PRE-PROCEDURE:   Procedure:  Coronary angiogram, possible percutaneous coronary intervention, left heart catheterization    Written consent obtained?: Yes    Risks and benefits: Risks, benefits and alternatives were discussed    Consent given by:  Patient  Patient states understanding of procedure being performed: Yes    Patient's understanding of procedure matches consent: Yes    Procedure consent matches procedure scheduled: Yes    Expected level of sedation:  Moderate  Appropriately NPO:  Yes  ASA Class:  2  Mallampati  :  Grade 2- soft palate, base of uvula, tonsillar pillars, and portion of posterior pharyngeal wall visible  Lungs:  Lungs clear with good breath sounds bilaterally  Heart:  Normal heart sounds and rate  History & Physical reviewed:  History and physical reviewed and updates made (see comment)  H&P Comments:  Clinically Significant Risk Factors Present on Admission    Cardiovascular : Coronary artery disease, preserved ejection fraction, mild to moderate mitral regurgitation and tricuspid regurgitation    Fluid & Electrolyte Disorders : Not present on admission    Gastroenterology : Not present on admission    Hematology/Oncology : Not present on admission    Nephrology : Not present on admission    Neurology : Not present on admission    Pulmonology : Not present on admission    Systemic : Not present on admission    [unfilled]    Statement of review:  I have reviewed the lab findings, diagnostic data, medications, and the plan for sedation

## 2024-12-06 NOTE — DISCHARGE INSTRUCTIONS
Interventional Cardiology  Coronary Angiogram/Angioplasty/Stent/Atherectomy Discharge Instructions -   Femoral Approach    The instructions below are to help you understand how to take care of yourself. There is also information about when to call the doctor or emergency services        For the first 72 hours after your procedure:  No driving for 24 hours  Do not lift more than 15 pounds.  If you usually lift 50 pounds or more daily, please contact your cardiologist  Avoid any hard work or tiring activities, this includes, yard work, jogging, biking, sexual activity  During the day get up and walk around every 2 hours  You may return to work after 72 hours if you are feeling well and your job does not involve heavy lifting          Groin Site / Wound Care / Bleeding    You may take off the dressing on your groin the day after your procedure  Keep the area dry and clean  It is ok to shower with regular soap.  Pat dry, do not rub  You do not need to use a bandage  No tub/pool or hot tub for 1 week  If your groin starts to bleed or begins to swell suddenly after leaving the hospital, lie flat and apply firm pressure above the site for 15 minutes.  If bleeding continues, call 9-1-1  Bruising around the groin area is normal.  It may take 2-3 weeks for this to go away.  It is normal for the bruised area to turn green and/or yellow as it is healing.  A small lump may also be present and may last 2-3 months.  Your leg may be sore or stiff for a few days.  You may take Tylenol or a pain medicine recommended by your doctor  If you have a fever over 100.4, that lasts more than one day - call your cardiologist.    Your Procedural Physician was: Jorgito  the phone number is: [phone]      [facility]:  [phone]  If you are calling after hours, please listen to the entire voicemail, a live  will answer at the end of the message

## 2024-12-06 NOTE — PROGRESS NOTES
Patient is kept comfortable during post-procedure stay. VSS. Denies pain. Right femoral access site remains dry & free from signs of bleeding. Tolerated food and fluids. Ambulated without issues. Appointments made & included in AVS. Dr. Bull was able to speak with patient post procedure. Post-op instructions reviewed and packet given to patient & daughter. Able to ask questions. Verbalized no concerns. Belongings returned. Discharged  in stable condition.

## 2024-12-19 ENCOUNTER — LAB REQUISITION (OUTPATIENT)
Dept: LAB | Facility: CLINIC | Age: 84
End: 2024-12-19

## 2024-12-19 ENCOUNTER — TRANSFERRED RECORDS (OUTPATIENT)
Dept: HEALTH INFORMATION MANAGEMENT | Facility: CLINIC | Age: 84
End: 2024-12-19
Payer: COMMERCIAL

## 2024-12-19 DIAGNOSIS — R06.09 OTHER FORMS OF DYSPNEA: ICD-10-CM

## 2024-12-19 LAB
ANION GAP SERPL CALCULATED.3IONS-SCNC: 11 MMOL/L (ref 7–15)
BUN SERPL-MCNC: 26.1 MG/DL (ref 8–23)
CALCIUM SERPL-MCNC: 9.2 MG/DL (ref 8.8–10.4)
CHLORIDE SERPL-SCNC: 106 MMOL/L (ref 98–107)
CREAT SERPL-MCNC: 1.29 MG/DL (ref 0.67–1.17)
EGFRCR SERPLBLD CKD-EPI 2021: 54 ML/MIN/1.73M2
GLUCOSE SERPL-MCNC: 99 MG/DL (ref 70–99)
HCO3 SERPL-SCNC: 25 MMOL/L (ref 22–29)
MAGNESIUM SERPL-MCNC: 2.1 MG/DL (ref 1.7–2.3)
POTASSIUM SERPL-SCNC: 4.5 MMOL/L (ref 3.4–5.3)
SODIUM SERPL-SCNC: 142 MMOL/L (ref 135–145)
TSH SERPL DL<=0.005 MIU/L-ACNC: 4.93 UIU/ML (ref 0.3–4.2)

## 2024-12-19 PROCEDURE — 84443 ASSAY THYROID STIM HORMONE: CPT | Performed by: FAMILY MEDICINE

## 2024-12-19 PROCEDURE — 82435 ASSAY OF BLOOD CHLORIDE: CPT | Performed by: FAMILY MEDICINE

## 2024-12-19 PROCEDURE — 84520 ASSAY OF UREA NITROGEN: CPT | Performed by: FAMILY MEDICINE

## 2024-12-19 PROCEDURE — 80048 BASIC METABOLIC PNL TOTAL CA: CPT | Performed by: FAMILY MEDICINE

## 2024-12-19 PROCEDURE — 83735 ASSAY OF MAGNESIUM: CPT | Performed by: FAMILY MEDICINE

## 2024-12-19 PROCEDURE — 84132 ASSAY OF SERUM POTASSIUM: CPT | Performed by: FAMILY MEDICINE

## 2024-12-24 ENCOUNTER — TRANSFERRED RECORDS (OUTPATIENT)
Dept: HEALTH INFORMATION MANAGEMENT | Facility: CLINIC | Age: 84
End: 2024-12-24

## 2025-01-27 ENCOUNTER — TRANSFERRED RECORDS (OUTPATIENT)
Dept: HEALTH INFORMATION MANAGEMENT | Facility: CLINIC | Age: 85
End: 2025-01-27

## 2025-02-27 ENCOUNTER — MEDICAL CORRESPONDENCE (OUTPATIENT)
Dept: HEALTH INFORMATION MANAGEMENT | Facility: CLINIC | Age: 85
End: 2025-02-27
Payer: COMMERCIAL

## 2025-03-13 ENCOUNTER — OFFICE VISIT (OUTPATIENT)
Dept: CARDIOLOGY | Facility: CLINIC | Age: 85
End: 2025-03-13
Payer: COMMERCIAL

## 2025-03-13 VITALS
DIASTOLIC BLOOD PRESSURE: 54 MMHG | BODY MASS INDEX: 25.81 KG/M2 | WEIGHT: 148 LBS | SYSTOLIC BLOOD PRESSURE: 156 MMHG | OXYGEN SATURATION: 96 % | HEART RATE: 49 BPM

## 2025-03-13 DIAGNOSIS — I49.3 PVC'S (PREMATURE VENTRICULAR CONTRACTIONS): Primary | ICD-10-CM

## 2025-03-13 NOTE — LETTER
3/13/2025    Kieran Griffin MD, MD  [address]  [address] [place] [address]    RE: Kevin Leblanc       Dear Colleague,     I had the pleasure of seeing Kevin Leblanc in the [facility].     [facility]  Cardiac Electrophysiology  [address]  [place] [address]   Office: [phone]  Fax: [phone]     Patient: Kevin Leblanc   : 1940       CHIEF COMPLAINT/REASON FOR VISIT  Premature ventricular contraction      Assessment/Recommendations     Sinus node dysfunction - intermittent sinus bradycardia, chronotropic insufficiency - possibly symptomatic with fatigue and exertional dyspnea.  He also has frequent PVCs - at present, it is not possible to know whether SND or PVCs would be a more significant  of his symptoms.  Frequent PVCs may limit normal pacemaker function.   Pacemaker implantation is reasonable for symptomatic SND.  We reviewed the rationale for pacemaker implantation, reviewed the implant procedure, risks (including pneumothorax, lead dislodgement, perforation, tamponade, system infection), recovery expectations, and remote monitoring.    - at present, given improvement in his symptoms since stopping metoprolol, he would prefer expectant management     Premature ventricular contractions - 42% burden by 2024 monitoring.  Normal LV function.  He may be symptomatic with fatigue and exertional dyspnea.  Unifocal of RBSA (rs I, rS inferiorly, V5-6 transition) morphology consistent with LPF vs PMPM site of origin.     We reviewed PVCs and treatment options including observation, medical therapy, and PVC ablation.  We reviewed PVC mapping and ablation procedures, risks (including groin bleeding, vascular injury, stroke, tamponade, heart block) and recovery expectations.  - at present, given improvement in his symptoms since stopping metoprolol, he would prefer expectant management     Follow up: he will continue to follow with  his established care team, and will let us know in case of symptomatic progression.           History of Present Illness   Kevin Leblanc is a 84 year old male with frequent premature ventricular contractions, sinus node dysfunction, RBBB, CAD with prior CABG, carotid stenosis with prior bilateral CEAs referred by Dr. Selby for consultation regarding PVCs.    Mr. Leblanc had noted shortness of breath and fatigue with mild-moderate exertion including stairs or walking rapidly.  He was noted to have PVCs and sinus node dysfunction.  He underwent cardiac rhythm monitoring 12/2024 showing very frequent PVCs at 42% frequency, with sinus rates 28-87bpm, average 61bpm.  He had been on metoprolol for hypertension treatment - this was discontinued with some improvement in his symptoms thereafter.  He denies syncope, lightheadedness.      He is seen today with his daughter who provides interpretation.         Physical Examination  Review of Systems   VITALS: BP (!) 156/54 (BP Location: Left arm, Patient Position: Sitting, Cuff Size: Adult Regular)   Pulse (!) 49   Wt 67.1 kg (148 lb)   SpO2 96%   BMI 25.81 kg/m    Wt Readings from Last 3 Encounters:   12/06/24 68 kg (150 lb)   11/27/24 67.9 kg (149 lb 9.6 oz)   11/20/24 71.2 kg (156 lb 15.5 oz)     CONSTITUTIONAL: well nourished, comfortable, no distress  EYES:  Conjunctivae pink, sclerae clear.    E/N/T:  Oral mucosa pink  RESPIRATORY:  Respiratory effort is normal  CARDIOVASCULAR:  bradycardic, normal S1 and S2  GASTROINTESTINAL:  Abdomen without masses or tenderness  EXTREMITIES:  No clubbing or cyanosis.    MUSCULOSKELETAL:  Overall grossly normal muscle strength  SKIN:  Overall, skin warm and dry, no lesions.  NEURO/PSYCH:  Oriented x 3 with normal affect.   Constitutional:  No weight loss or loss of appetite    Eyes:  No difficulty with vision, no double vision, no dry eyes  ENT:  No sore throat, difficulty swallowing; changes in hearing or tinnitus  Cardiovascular: As  detailed above  Respiratory:  No cough  Musculoskeletal  No joint pain, muscle aches  Neurologic:  No syncope, lightheadedness, fainting spells   Hematologic: No easy bruising, excessive bleeding tendency   Gastrointestinal:  No jaundice, abdominal pain or abdominal bloating  Genitourinary: No changes in urinary habits, no trouble urinating    Psychiatric: No anxiety or depression      Medical History  Surgical History   No past medical history on file. Past Surgical History:   Procedure Laterality Date     ANGIOPLASTY  2001     BYPASS GRAFT ARTERY CORONARY  2015     CAROTID ENDARTERECTOMY Right 5/15/2018    Procedure: RIGHT CAROTID ENDARTERECTOMY WITH NEURO MONITORING;  Surgeon: Domingo Guy MD;  Location: [facility];  Service:      CV ANGIOGRAM CORONARY GRAFT N/A 2024    Procedure: Coronary Angiogram Graft;  Surgeon: Nickolas Bull MD;  Location: [facility] CATH LAB CV     CV LEFT HEART CATH N/A 2024    Procedure: Left Heart Catheterization;  Surgeon: Nickolas Bull MD;  Location: [facility] CATH LAB CV     OTHER SURGICAL HISTORY      Scar present form left carotid endarterectomyunknown     OTHER SURGICAL HISTORY      Multiple [id number], 3/2015         Family History Social History   No family history on file.     Social History     Tobacco Use     Smoking status: Former     Current packs/day: 0.00     Average packs/day: 1 pack/day for 26.0 years (26.0 ttl pk-yrs)     Types: Cigarettes     Start date: 1945     Quit date: 1971     Years since quittin.2     Smokeless tobacco: Never   Substance Use Topics     Alcohol use: No     Drug use: No         Medications  Allergies     Current Outpatient Medications:      aspirin 81 MG EC tablet, [ASPIRIN 81 MG EC TABLET] Take 1 tablet (81 mg total) by mouth daily., Disp: , Rfl: 0     atorvastatin (LIPITOR) 40 MG tablet, [ATORVASTATIN (LIPITOR) 40 MG TABLET] Take 40 mg by mouth at bedtime. , Disp: , Rfl: 4     doxazosin (CARDURA) 8 MG tablet,  "Take 1 tablet by mouth daily at 2 pm., Disp: , Rfl:      finasteride (PROSCAR) 5 MG tablet, Take 1 tablet by mouth daily at 2 pm., Disp: , Rfl:      furosemide (LASIX) 20 MG tablet, Take 1 tablet (20 mg) by mouth daily., Disp: 30 tablet, Rfl: 0     ibuprofen (ADVIL,MOTRIN) 800 MG tablet, [IBUPROFEN (ADVIL,MOTRIN) 800 MG TABLET] Take 800 mg by mouth every 8 (eight) hours as needed for pain. , Disp: , Rfl:      metoprolol tartrate (LOPRESSOR) 25 MG tablet, [METOPROLOL TARTRATE (LOPRESSOR) 25 MG TABLET] Take 1 tablet (25 mg total) by mouth 2 (two) times a day., Disp: 60 tablet, Rfl: 0     nitroglycerin (NITROSTAT) 0.4 MG SL tablet, [NITROGLYCERIN (NITROSTAT) 0.4 MG SL TABLET] Place 0.4 mg under the tongue every 5 (five) minutes as needed. , Disp: , Rfl: 1     OMEGA-3 FATTY ACIDS-VITAMIN E PO, Take 1,000 capsules by mouth daily., Disp: , Rfl:      tamsulosin (FLOMAX) 0.4 mg Cp24, [TAMSULOSIN (FLOMAX) 0.4 MG CP24] Take 0.4 mg by mouth daily after supper. , Disp: , Rfl: 1     triamcinolone (KENALOG) 0.1 % external cream, Apply topically as needed for irritation., Disp: , Rfl:      Allergies   Allergen Reactions     Nsaids Anaphylaxis and Unknown     Pt currently taking ibuprofen     Plavix [Clopidogrel] Anaphylaxis     Vioxx [Rofecoxib] Anaphylaxis     Oxycodone Unknown     Morphine Anxiety and Other (See Comments)     Reaction(s): Anxiety and Hallucinations.          Lab Results    Chemistry CBC Cardiac Enzymes/BNP/TSH/INR   Recent Labs   Lab Test 12/19/24  1604      POTASSIUM 4.5   CHLORIDE 106   CO2 25   GLC 99   BUN 26.1*   CR 1.29*   GFRESTIMATED 54*   KEEGAN 9.2     Recent Labs   Lab Test 12/19/24  1604 12/06/24  0750 11/27/24  0906   CR 1.29* 1.23* 1.16          Recent Labs   Lab Test 12/06/24  0750   WBC 11.1*   HGB 14.0   HCT 42.4   MCV 89        Recent Labs   Lab Test 12/06/24  0750 11/20/24  1849 07/05/19  1130   HGB 14.0 11.6* 15.0    No results for input(s): \"TROPONINI\" in the last 72000 " "hours.  Recent Labs   Lab Test 11/20/24  1849 07/05/19  1130   BNP  --  101*   NTBNPI 1,756  --      Recent Labs   Lab Test 12/19/24  1604   TSH 4.93*     No results for input(s): \"INR\" in the last 05474 hours.      Data Review    ECGs (tracings independently reviewed)  12/24/2024 - SR with bigeminal PVCs 64bpm, incomplete RBBB QRS 114ms, PVCs of RBSA (rs I, rS inferiorly, V5-6 transition)    Cardiac event monitoring from 12/24/2024 to 12/26/2024 (monitored duration 2d 32m) (independently reviewed)  Predominant rhythm was sinus rhythm, 28 (12/26/2024 0732) to 87bpm, average 61bpm.  3 nonsustained SVT - longest 8 beats.  No NSVT.  No atrial fibrillation.  There were no pauses of over 3.0s.  Rare supraventricular ectopic beats (<1%).  Frequent premature ventricular contractions (42.3%).  Intermittent triplets.          8/15/2024 TTE      12/6/2024 coronary angiography  1.  Mildly elevated left heart filling pressures.  2.  Severe native three-vessel coronary artery disease.  3.  Patent saphenous venous conduits to the left anterior descending, obtuse marginal, and posterior descending coronary arteries.  4.  Patent left internal thoracic arterial conduit to the left anterior descending coronary artery.       65 minutes was spent reviewing the chart and in direct discussion with the patient.    Cc: Siddhartha Kebede MD, Kieran Griffin MD Amila Dilusha William, MD  3/13/2025  4:39 PM        Thank you for allowing me to participate in the care of your patient.      Sincerely,     Vero Watson MD     [facility]  cc:   No referring provider defined for this encounter.      "

## 2025-03-13 NOTE — PROGRESS NOTES
[facility]  Cardiac Electrophysiology  [address]  [place] [address]   Office: [phone]  Fax: [phone]     Patient: Kevin Leblanc   : 1940       CHIEF COMPLAINT/REASON FOR VISIT  Premature ventricular contraction      Assessment/Recommendations     Sinus node dysfunction - intermittent sinus bradycardia, chronotropic insufficiency - possibly symptomatic with fatigue and exertional dyspnea.  He also has frequent PVCs - at present, it is not possible to know whether SND or PVCs would be a more significant  of his symptoms.  Frequent PVCs may limit normal pacemaker function.   Pacemaker implantation is reasonable for symptomatic SND.  We reviewed the rationale for pacemaker implantation, reviewed the implant procedure, risks (including pneumothorax, lead dislodgement, perforation, tamponade, system infection), recovery expectations, and remote monitoring.    - at present, given improvement in his symptoms since stopping metoprolol, he would prefer expectant management     Premature ventricular contractions - 42% burden by 2024 monitoring.  Normal LV function.  He may be symptomatic with fatigue and exertional dyspnea.  Unifocal of RBSA (rs I, rS inferiorly, V5-6 transition) morphology consistent with LPF vs PMPM site of origin.     We reviewed PVCs and treatment options including observation, medical therapy, and PVC ablation.  We reviewed PVC mapping and ablation procedures, risks (including groin bleeding, vascular injury, stroke, tamponade, heart block) and recovery expectations.  - at present, given improvement in his symptoms since stopping metoprolol, he would prefer expectant management     Follow up: he will continue to follow with his established care team, and will let us know in case of symptomatic progression.           History of Present Illness   Kevin Leblanc is a 84 year old male with frequent premature ventricular contractions,  sinus node dysfunction, RBBB, CAD with prior CABG, carotid stenosis with prior bilateral CEAs referred by Dr. Selby for consultation regarding PVCs.    Mr. Leblanc had noted shortness of breath and fatigue with mild-moderate exertion including stairs or walking rapidly.  He was noted to have PVCs and sinus node dysfunction.  He underwent cardiac rhythm monitoring 12/2024 showing very frequent PVCs at 42% frequency, with sinus rates 28-87bpm, average 61bpm.  He had been on metoprolol for hypertension treatment - this was discontinued with some improvement in his symptoms thereafter.  He denies syncope, lightheadedness.      He is seen today with his daughter who provides interpretation.         Physical Examination  Review of Systems   VITALS: BP (!) 156/54 (BP Location: Left arm, Patient Position: Sitting, Cuff Size: Adult Regular)   Pulse (!) 49   Wt 67.1 kg (148 lb)   SpO2 96%   BMI 25.81 kg/m    Wt Readings from Last 3 Encounters:   12/06/24 68 kg (150 lb)   11/27/24 67.9 kg (149 lb 9.6 oz)   11/20/24 71.2 kg (156 lb 15.5 oz)     CONSTITUTIONAL: well nourished, comfortable, no distress  EYES:  Conjunctivae pink, sclerae clear.    E/N/T:  Oral mucosa pink  RESPIRATORY:  Respiratory effort is normal  CARDIOVASCULAR:  bradycardic, normal S1 and S2  GASTROINTESTINAL:  Abdomen without masses or tenderness  EXTREMITIES:  No clubbing or cyanosis.    MUSCULOSKELETAL:  Overall grossly normal muscle strength  SKIN:  Overall, skin warm and dry, no lesions.  NEURO/PSYCH:  Oriented x 3 with normal affect.   Constitutional:  No weight loss or loss of appetite    Eyes:  No difficulty with vision, no double vision, no dry eyes  ENT:  No sore throat, difficulty swallowing; changes in hearing or tinnitus  Cardiovascular: As detailed above  Respiratory:  No cough  Musculoskeletal  No joint pain, muscle aches  Neurologic:  No syncope, lightheadedness, fainting spells   Hematologic: No easy bruising, excessive bleeding tendency    Gastrointestinal:  No jaundice, abdominal pain or abdominal bloating  Genitourinary: No changes in urinary habits, no trouble urinating    Psychiatric: No anxiety or depression      Medical History  Surgical History   No past medical history on file. Past Surgical History:   Procedure Laterality Date    ANGIOPLASTY  2001    BYPASS GRAFT ARTERY CORONARY  2015    CAROTID ENDARTERECTOMY Right 5/15/2018    Procedure: RIGHT CAROTID ENDARTERECTOMY WITH NEURO MONITORING;  Surgeon: Domingo Guy MD;  Location: [facility];  Service:     CV ANGIOGRAM CORONARY GRAFT N/A 2024    Procedure: Coronary Angiogram Graft;  Surgeon: Nickolas Bull MD;  Location: [facility] CATH LAB CV    CV LEFT HEART CATH N/A 2024    Procedure: Left Heart Catheterization;  Surgeon: Nickolas Bull MD;  Location: [facility] CATH LAB CV    OTHER SURGICAL HISTORY      Scar present form left carotid endarterectomyunknown    OTHER SURGICAL HISTORY      Multiple [id number], 3/2015         Family History Social History   No family history on file.     Social History     Tobacco Use    Smoking status: Former     Current packs/day: 0.00     Average packs/day: 1 pack/day for 26.0 years (26.0 ttl pk-yrs)     Types: Cigarettes     Start date: 1945     Quit date: 1971     Years since quittin.2    Smokeless tobacco: Never   Substance Use Topics    Alcohol use: No    Drug use: No         Medications  Allergies     Current Outpatient Medications:     aspirin 81 MG EC tablet, [ASPIRIN 81 MG EC TABLET] Take 1 tablet (81 mg total) by mouth daily., Disp: , Rfl: 0    atorvastatin (LIPITOR) 40 MG tablet, [ATORVASTATIN (LIPITOR) 40 MG TABLET] Take 40 mg by mouth at bedtime. , Disp: , Rfl: 4    doxazosin (CARDURA) 8 MG tablet, Take 1 tablet by mouth daily at 2 pm., Disp: , Rfl:     finasteride (PROSCAR) 5 MG tablet, Take 1 tablet by mouth daily at 2 pm., Disp: , Rfl:     furosemide (LASIX) 20 MG tablet, Take 1 tablet (20 mg) by mouth daily.,  "Disp: 30 tablet, Rfl: 0    ibuprofen (ADVIL,MOTRIN) 800 MG tablet, [IBUPROFEN (ADVIL,MOTRIN) 800 MG TABLET] Take 800 mg by mouth every 8 (eight) hours as needed for pain. , Disp: , Rfl:     metoprolol tartrate (LOPRESSOR) 25 MG tablet, [METOPROLOL TARTRATE (LOPRESSOR) 25 MG TABLET] Take 1 tablet (25 mg total) by mouth 2 (two) times a day., Disp: 60 tablet, Rfl: 0    nitroglycerin (NITROSTAT) 0.4 MG SL tablet, [NITROGLYCERIN (NITROSTAT) 0.4 MG SL TABLET] Place 0.4 mg under the tongue every 5 (five) minutes as needed. , Disp: , Rfl: 1    OMEGA-3 FATTY ACIDS-VITAMIN E PO, Take 1,000 capsules by mouth daily., Disp: , Rfl:     tamsulosin (FLOMAX) 0.4 mg Cp24, [TAMSULOSIN (FLOMAX) 0.4 MG CP24] Take 0.4 mg by mouth daily after supper. , Disp: , Rfl: 1    triamcinolone (KENALOG) 0.1 % external cream, Apply topically as needed for irritation., Disp: , Rfl:      Allergies   Allergen Reactions    Nsaids Anaphylaxis and Unknown     Pt currently taking ibuprofen    Plavix [Clopidogrel] Anaphylaxis    Vioxx [Rofecoxib] Anaphylaxis    Oxycodone Unknown    Morphine Anxiety and Other (See Comments)     Reaction(s): Anxiety and Hallucinations.          Lab Results    Chemistry CBC Cardiac Enzymes/BNP/TSH/INR   Recent Labs   Lab Test 12/19/24  1604      POTASSIUM 4.5   CHLORIDE 106   CO2 25   GLC 99   BUN 26.1*   CR 1.29*   GFRESTIMATED 54*   KEEGAN 9.2     Recent Labs   Lab Test 12/19/24  1604 12/06/24  0750 11/27/24  0906   CR 1.29* 1.23* 1.16          Recent Labs   Lab Test 12/06/24  0750   WBC 11.1*   HGB 14.0   HCT 42.4   MCV 89        Recent Labs   Lab Test 12/06/24  0750 11/20/24  1849 07/05/19  1130   HGB 14.0 11.6* 15.0    No results for input(s): \"TROPONINI\" in the last 23689 hours.  Recent Labs   Lab Test 11/20/24  1849 07/05/19  1130   BNP  --  101*   NTBNPI 1,756  --      Recent Labs   Lab Test 12/19/24  1604   TSH 4.93*     No results for input(s): \"INR\" in the last 41128 hours.      Data Review    ECGs " (tracings independently reviewed)  12/24/2024 - SR with bigeminal PVCs 64bpm, incomplete RBBB QRS 114ms, PVCs of RBSA (rs I, rS inferiorly, V5-6 transition)    Cardiac event monitoring from 12/24/2024 to 12/26/2024 (monitored duration 2d 32m) (independently reviewed)  Predominant rhythm was sinus rhythm, 28 (12/26/2024 0732) to 87bpm, average 61bpm.  3 nonsustained SVT - longest 8 beats.  No NSVT.  No atrial fibrillation.  There were no pauses of over 3.0s.  Rare supraventricular ectopic beats (<1%).  Frequent premature ventricular contractions (42.3%).  Intermittent triplets.          8/15/2024 TTE      12/6/2024 coronary angiography  1.  Mildly elevated left heart filling pressures.  2.  Severe native three-vessel coronary artery disease.  3.  Patent saphenous venous conduits to the left anterior descending, obtuse marginal, and posterior descending coronary arteries.  4.  Patent left internal thoracic arterial conduit to the left anterior descending coronary artery.       65 minutes was spent reviewing the chart and in direct discussion with the patient.    Cc: Siddhartha Kebede MD, Kieran Griffin MD Amila Dilusha William, MD  3/13/2025  4:39 PM

## 2025-03-13 NOTE — PATIENT INSTRUCTIONS
[facility]  Cardiac Electrophysiology  [address]  [place] [address]   Office: [phone]  Fax: [phone]       Thank you for seeing us in clinic today - it is a pleasure to be a part of your care team.  Below is a summary of our plan from today's visit.      You have had evidence of very frequent premature ventricular contractions (PVCs) which occurred at 42% frequency on your 12/2024 heart rhythm monitoring.  You also have had evidence of sinus node dysfunction.      We reviewed PVCs and treatment including PVC ablation and reviewed risks.  We also reviewed sinus node dysfunction and treatment via pacemaker and reviewed risks.  At present, you elected to continue monitor.    Please do not hesitate to be in touch with our office at [phone] with any questions that may arise.      Thank you for trusting us with your care,    Vero Watson MD  Clinical Cardiac Electrophysiology  [facility]  [address]  [place] [address]   Office: [phone]  Fax: [phone]

## 2025-04-07 ENCOUNTER — LAB REQUISITION (OUTPATIENT)
Dept: LAB | Facility: CLINIC | Age: 85
End: 2025-04-07

## 2025-04-07 DIAGNOSIS — E78.2 MIXED HYPERLIPIDEMIA: ICD-10-CM

## 2025-04-07 PROCEDURE — 80061 LIPID PANEL: CPT | Performed by: FAMILY MEDICINE

## 2025-04-08 LAB
CHOLEST SERPL-MCNC: 115 MG/DL
FASTING STATUS PATIENT QL REPORTED: YES
HDLC SERPL-MCNC: 37 MG/DL
LDLC SERPL CALC-MCNC: 65 MG/DL
NONHDLC SERPL-MCNC: 78 MG/DL
TRIGL SERPL-MCNC: 63 MG/DL

## 2025-07-02 DIAGNOSIS — I20.9 ANGINA PECTORIS, UNSPECIFIED: ICD-10-CM

## 2025-07-02 DIAGNOSIS — R94.39 ABNORMAL STRESS TEST: Primary | ICD-10-CM

## 2025-08-29 ENCOUNTER — APPOINTMENT (OUTPATIENT)
Dept: INTERPRETER SERVICES | Facility: CLINIC | Age: 85
End: 2025-08-29
Payer: COMMERCIAL

## 2025-08-30 ENCOUNTER — ORDERS ONLY (AUTO-RELEASED) (OUTPATIENT)
Dept: CARDIOLOGY | Facility: CLINIC | Age: 85
End: 2025-08-30
Payer: COMMERCIAL

## 2025-08-30 DIAGNOSIS — I49.3 PVC'S (PREMATURE VENTRICULAR CONTRACTIONS): ICD-10-CM

## (undated) DEVICE — SYR ANGIOGRAPHY MULTIUSE KIT ACIST 014612

## (undated) DEVICE — INTRO MICRO MINI STICK 4FR STIFF NITINOL 45-753

## (undated) DEVICE — GUIDEWIRE TERUMO .035X180 ANG GR3508

## (undated) DEVICE — CATH ANGIO JUDKINS R4 6FRX100CM INFINITI 534621T

## (undated) DEVICE — MANIFOLD KIT ANGIO AUTOMATED 014613

## (undated) DEVICE — KIT HAND CONTROL ACIST 014644 AR-P54

## (undated) DEVICE — CUSTOM PACK CORONARY SAN5BCRHEA

## (undated) DEVICE — Device

## (undated) DEVICE — INTRO SHEATH 6FRX10CM PINNACLE RSS602

## (undated) DEVICE — ELECTRODE DEFIB CADENCE 22550R

## (undated) DEVICE — CATH ANGIO JUDKINS JL4 6FRX100CM INFINITI 534620T

## (undated) RX ORDER — HEPARIN SODIUM 1000 [USP'U]/ML
INJECTION, SOLUTION INTRAVENOUS; SUBCUTANEOUS
Status: DISPENSED
Start: 2024-12-06

## (undated) RX ORDER — METOPROLOL TARTRATE 1 MG/ML
INJECTION, SOLUTION INTRAVENOUS
Status: DISPENSED
Start: 2024-12-06

## (undated) RX ORDER — FENTANYL CITRATE 50 UG/ML
INJECTION, SOLUTION INTRAMUSCULAR; INTRAVENOUS
Status: DISPENSED
Start: 2024-12-06